# Patient Record
Sex: MALE | Race: WHITE | HISPANIC OR LATINO | Employment: UNEMPLOYED | URBAN - NONMETROPOLITAN AREA
[De-identification: names, ages, dates, MRNs, and addresses within clinical notes are randomized per-mention and may not be internally consistent; named-entity substitution may affect disease eponyms.]

---

## 2017-01-03 DIAGNOSIS — M10.00 IDIOPATHIC GOUT, UNSPECIFIED CHRONICITY, UNSPECIFIED SITE: Primary | ICD-10-CM

## 2017-01-03 RX ORDER — NAPROXEN 500 MG/1
500 TABLET ORAL 2 TIMES DAILY PRN
Qty: 30 TABLET | Refills: 3 | Status: SHIPPED | OUTPATIENT
Start: 2017-01-03 | End: 2017-01-16

## 2017-01-03 RX ORDER — COLCHICINE 0.6 MG/1
0.6 TABLET ORAL 2 TIMES DAILY
Qty: 60 TABLET | Refills: 2 | Status: SHIPPED | OUTPATIENT
Start: 2017-01-03 | End: 2017-03-09 | Stop reason: SDUPTHER

## 2017-01-03 NOTE — PROGRESS NOTES
Pt is calling with a gout flare. 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 Requesting something to help with gout flare.

## 2017-01-10 RX ORDER — FEBUXOSTAT 80 MG/1
80 TABLET, FILM COATED ORAL DAILY
Qty: 30 TABLET | Refills: 5 | Status: SHIPPED | OUTPATIENT
Start: 2017-01-10 | End: 2017-01-16

## 2017-01-10 RX ORDER — METHYLPREDNISOLONE 4 MG/1
TABLET ORAL
Qty: 21 TABLET | Refills: 0 | Status: SHIPPED | OUTPATIENT
Start: 2017-01-10 | End: 2017-01-16

## 2017-01-10 RX ORDER — ESCITALOPRAM OXALATE 5 MG/1
5 TABLET ORAL DAILY
Qty: 30 TABLET | Refills: 5 | Status: SHIPPED | OUTPATIENT
Start: 2017-01-10 | End: 2017-03-09 | Stop reason: SDUPTHER

## 2017-01-16 ENCOUNTER — OFFICE VISIT (OUTPATIENT)
Dept: FAMILY MEDICINE CLINIC | Facility: CLINIC | Age: 52
End: 2017-01-16

## 2017-01-16 VITALS
WEIGHT: 203 LBS | RESPIRATION RATE: 20 BRPM | HEIGHT: 73 IN | OXYGEN SATURATION: 95 % | BODY MASS INDEX: 26.9 KG/M2 | DIASTOLIC BLOOD PRESSURE: 74 MMHG | HEART RATE: 100 BPM | TEMPERATURE: 97.9 F | SYSTOLIC BLOOD PRESSURE: 120 MMHG

## 2017-01-16 DIAGNOSIS — K21.00 GERD WITH ESOPHAGITIS: ICD-10-CM

## 2017-01-16 DIAGNOSIS — F41.9 ANXIETY: ICD-10-CM

## 2017-01-16 DIAGNOSIS — Z13.9 SCREENING: ICD-10-CM

## 2017-01-16 DIAGNOSIS — M19.90 ARTHRITIS: Primary | ICD-10-CM

## 2017-01-16 PROCEDURE — 99214 OFFICE O/P EST MOD 30 MIN: CPT | Performed by: NURSE PRACTITIONER

## 2017-01-16 RX ORDER — ALLOPURINOL 100 MG/1
100 TABLET ORAL 2 TIMES DAILY
Qty: 60 TABLET | Refills: 5 | Status: SHIPPED | OUTPATIENT
Start: 2017-01-16 | End: 2017-09-26 | Stop reason: SDUPTHER

## 2017-01-16 RX ORDER — HYDROXYZINE PAMOATE 25 MG/1
25 CAPSULE ORAL NIGHTLY
Qty: 30 CAPSULE | Refills: 5 | Status: SHIPPED | OUTPATIENT
Start: 2017-01-16 | End: 2017-03-09 | Stop reason: SDUPTHER

## 2017-01-16 RX ORDER — HYDROCODONE BITARTRATE AND ACETAMINOPHEN 5; 325 MG/1; MG/1
1 TABLET ORAL EVERY 8 HOURS PRN
Qty: 60 TABLET | Refills: 0 | Status: SHIPPED | OUTPATIENT
Start: 2017-01-16 | End: 2017-03-09 | Stop reason: SDUPTHER

## 2017-01-16 RX ORDER — ESOMEPRAZOLE MAGNESIUM 40 MG/1
40 CAPSULE, DELAYED RELEASE ORAL DAILY
Qty: 30 CAPSULE | Refills: 5 | Status: SHIPPED | OUTPATIENT
Start: 2017-01-16 | End: 2017-03-09 | Stop reason: SDUPTHER

## 2017-01-16 NOTE — PROGRESS NOTES
Subjective   Barrington Chao is a 51 y.o. male.     History of Present Illness     Barrington is here today complaining of gout and arthritis pain. He has been taking some otc medications as well as some other medications he have from previous surgery.     GERD  Barrington reports that his gerd is stable with use of the Nexium. He does complain of at nights when he lays down having some tight feeling in his throat like food is stuck in his throat. He is unsure if this is anxiety as he does report that he has a hard time sleeping due to anxiety.     Anxiety  Mother is on life support, not expecting a recovery. Started Lexapro last week. Thinks his anxiety is some improved . He denies side effects, denies thoughts of hurting self or others.     Arthritis/gout  Pain in his back and knees. Taking his gout medication at this time. Has been trying Nsaids, not helping much. Rates his knee pain as 7 on psr. Described as throbbing aching pain in both knees and low back. 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 No history of substance abuse or addiction. Pt is requesting something for prn pain.       Uric acid [IHM092] (Order [id number])   Lab   Date: 6/14/2016 Department: [facility] FAM[facility] MEDICINE Released By: Katelynn Delgado MA Authorizing: DIPTI Perez          Uric acid   Order: [id number]   Status:  Final result   Visible to patient:  No (Not Released) Dx:  Pain in joint, multiple sites; Wheezi...   Notes Recorded by DIPTI Perez on 6/20/2016 at 8:39 AM  Start him on allopurinol 100mg daily x 1 week then 100mg bid . Call in # 60 5 refills.       ------    Notes Recorded by DIPTI Perez on 6/15/2016 at 8:00 AM  Start him on Allopurinol 100 mg twice daily. # 60 5 refills. Refer him to nephrology for low EGFR.     Lisa      Ref Range & Units 7mo ago     Uric Acid 2.4 - 5.7 mg/dL 7.8 (H)   [facility]    COR LAB      Specimen Collected: 06/14/16  4:52 PM Last Resulted: 06/14/16  6:03 PM                       Related Result Highlights       Comprehensive metabolic panel  Final result 6/14/2016             Lipid panel  Final result 6/14/2016            Other Results from 6/14/2016    Hemoglobin A1c  Final result 6/14/2016    Vitamin D 25 hydroxy  Final result 6/14/2016    H. pylori antibody, IgG  Final result 6/14/2016    PSA, total and free  Final result 6/14/2016   Uric acid [GXQ125] (Order [id number])   Lab   Date: 6/14/2016 Department: [facility] FAMILY MEDICINE Released By: Katelynn Delgado MA Authorizing: DIPTI Perez   Results   Uric acid (Order [id number])   6/14/2016  6:03 PM   Component Results   Component Value Flag Ref Range Units Status   Uric Acid 7.8 (H) 2.4 - 5.7 mg/dL Final   Lab and Collection   Uric acid (Order #[id number]) on 6/14/2016 - Lab and Collection Information   Reviewed By Lidia Mccarthy MA on 6/21/2016  8:17 AM   DIPTI Perez on 6/20/2016  8:39 AM   Danielle Mccarthy MA on 6/15/2016  2:07 PM   DIPTI Perez on 6/15/2016  8:00 AM   Call Information      Provider Department Center   6/14/2016 4:51 PM Katelynn Delgado MA [facility]    Testing Performed By   Lab - Abbreviation Name Director Address Valid Date Range   [id number] COR LAB [facility] LABORATORY Cecilia Londono MD [[id number]] <br>1 [facility]  [place] [address] 10/26/15 1318-Present   Additional Information   Specimen ID Draw Type Specimen Type Specimen Source Bill Type Client ID   [id number] Venipuncture Blood Arm, Left Client [1]    Specimen Date Taken Specimen Time Taken Specimen Received Date Specimen Received Time Result Date Result Time   Jun 14, 2016  4:52 PM Jun 14, 2016  5:03 PM Jun 14, 2016  6:03 PM   Recipient List for Orders   Sent From To Cc'd Forwarded To Results   1/16/2017  8:58 AM     Uric  acid [[id number]]          Order History   Outpatient   Date/Time Action Taken User Additional Information   06/14/16 1651 Release Katelynn Delgado MA From Order: [id number]   06/14/16 1803 Result Lab, Background User Final   Order Details   Frequency Duration Priority Order Class   None None Routine Lab Collect   Order Information   Order Date/Time Release Date/Time Start Date/Time End Date/Time   06/14/16 04:51 PM 06/14/16 04:51 PM 06/14/16 04:51 PM None   Related Result Highlights       Comprehensive metabolic panel  Final result 6/14/2016             Lipid panel  Final result 6/14/2016            Other Results from 6/14/2016    Hemoglobin A1c  Final result 6/14/2016    Vitamin D 25 hydroxy  Final result 6/14/2016    H. pylori antibody, IgG  Final result 6/14/2016    PSA, total and free  Final result 6/14/2016   Verbal Order Info   Action Created on Order Mode Entered by Responsible Provider Signed by Signed on   Ordering 06/14/16 1651 Per protocol: no cosign required Katelynn Delgado MA  Signature Not Required    Clinical Indications      ICD-10-CM ICD-9-CM   Problems with swallowing and mastication     R13.10 V41.6   Fatigue due to excessive exertion, initial encounter     T73.3XXA 994.5  E927.9   Pain in joint, multiple sites     M25.50 719.49   Wheezing     R06.2 786.07   Screening     Z13.9 V82.9   Hesitancy     R39.11 788.64   Reprint Order Requisition   URIC ACID (Order #[id number]) on 6/14/16   Encounter   View Encounter       Order Provider Info       Office phone Pager E-mail   Ordering User Katelynn Delgado MA -- -- --   Authorizing Provider Lisa Dominguez, APRN [phone] -- --   Lab Component SmartPhrase Guide   URIC ACID (Order #[id number]) on 6/14/16   Order Transmittal Tracking   URIC ACID (Order #[id number]) on 6/14/16   Medication Detail      Disp Refills Start End      URIC ACID   6/14/2016 6/14/2016    Class: Lab Collect      Most recent Uric acid level reviewed.     The  following portions of the patient's history were reviewed and updated as appropriate: allergies, current medications, past family history, past medical history, past social history, past surgical history and problem list.    Review of Systems   Constitutional: Positive for activity change. Negative for appetite change, chills, fatigue and fever.   HENT: Negative for ear pain, facial swelling, hearing loss, sinus pressure, sore throat, trouble swallowing and voice change.    Eyes: Negative for pain, discharge and visual disturbance.   Respiratory: Negative for apnea, cough, chest tightness, shortness of breath and wheezing.    Cardiovascular: Negative for chest pain, palpitations and leg swelling.   Gastrointestinal: Negative for abdominal pain, blood in stool, constipation, diarrhea, nausea and vomiting.   Endocrine: Negative.    Genitourinary: Negative for dysuria and flank pain.   Musculoskeletal: Positive for arthralgias and back pain. Negative for neck stiffness.   Skin: Negative for color change.   Allergic/Immunologic: Negative.    Neurological: Negative.  Negative for headaches.   Hematological: Negative for adenopathy. Does not bruise/bleed easily.   Psychiatric/Behavioral: Positive for sleep disturbance. Negative for confusion, self-injury and suicidal ideas. The patient is nervous/anxious.    All other systems reviewed and are negative.      Objective   Physical Exam   Constitutional: He is oriented to person, place, and time. He appears well-developed and well-nourished.   HENT:   Head: Normocephalic.   Right Ear: External ear normal.   Left Ear: External ear normal.   Nose: Nose normal.   Mouth/Throat: Oropharynx is clear and moist.   Eyes: Pupils are equal, round, and reactive to light.   Neck: Trachea normal and normal range of motion. Neck supple. No tracheal deviation present. No thyromegaly present.   Cardiovascular: Normal rate, regular rhythm and normal heart sounds.  Exam reveals no gallop and no  friction rub.    No murmur heard.  Pulmonary/Chest: Effort normal and breath sounds normal. No respiratory distress. He has no wheezes. He has no rales. He exhibits no tenderness.   Abdominal: Soft. Bowel sounds are normal. He exhibits no distension and no mass. There is no tenderness. There is no rebound and no guarding.   Musculoskeletal: Normal range of motion.        Lumbar back: He exhibits pain.   Crepitus both knees    Neurological: He is alert and oriented to person, place, and time. He has normal reflexes.   CN 2-12 grossly intact    Skin: Skin is warm and dry. No rash noted. No erythema.   Psychiatric: He has a normal mood and affect. His speech is normal and behavior is normal. Judgment and thought content normal. Cognition and memory are normal.   Vitals reviewed.      Assessment/Plan   Diagnoses and all orders for this visit:    Arthritis  -     Urine Drug Screen    Anxiety  -     Urine Drug Screen    Screening  -     Ambulatory Referral to General Surgery    GERD with esophagitis    Other orders  -     HYDROcodone-acetaminophen (NORCO) 5-325 MG per tablet; Take 1 tablet by mouth Every 8 (Eight) Hours As Needed for moderate pain (4-6).  -     esomeprazole (nexIUM) 40 MG capsule; Take 1 capsule by mouth Daily.  -     hydrOXYzine (VISTARIL) 25 MG capsule; Take 1 capsule by mouth Every Night.  -     allopurinol (ZYLOPRIM) 100 MG tablet; Take 1 tablet by mouth 2 (Two) Times a Day.        Continue Lexapro  Uds today  Emotional support and active listening provided.   Joe requested.   Continue on otc nsaids prn and use of tylenol with in guidelines remaining less than 1500 mg daily.   Norco prn.   Refer to Dr. Fernandez for EGD and colonoscopy for screening.   No driving on narcotic medication or vistaril until effect can be determined.   Body mechanics.   Follow up in one month, sooner if needed.

## 2017-01-16 NOTE — MR AVS SNAPSHOT
Barrington Chao   1/16/2017 7:45 AM   Office Visit    Dept Phone:  [phone]   Encounter #:  [id number]    Provider:  DIPTI Perez   Department:  [facility] FAMILY MEDICINE                Your Full Care Plan              Today's Medication Changes          These changes are accurate as of: 1/16/17  8:05 AM.  If you have any questions, ask your nurse or doctor.               New Medication(s)Ordered:     HYDROcodone-acetaminophen 5-325 MG per tablet   Commonly known as:  NORCO   Take 1 tablet by mouth Every 8 (Eight) Hours As Needed for moderate pain (4-6).   Started by:  DIPTI Perez       hydrOXYzine 25 MG capsule   Commonly known as:  VISTARIL   Take 1 capsule by mouth Every Night.   Started by:  DIPTI Perez         Stop taking medication(s)listed here:     MethylPREDNISolone 4 MG tablet   Commonly known as:  MEDROL (JOSE LUIS)   Stopped by:  DIPTI Perez           naproxen 500 MG tablet   Commonly known as:  NAPROSYN   Stopped by:  DIPTI Perez                Where to Get Your Medications      These medications were sent to [place], [place] - [address] [phone] [facility] [phone] [address] [address]     Phone:  [phone]     esomeprazole 40 MG capsule    hydrOXYzine 25 MG capsule         You can get these medications from any pharmacy     Bring a paper prescription for each of these medications     HYDROcodone-acetaminophen 5-325 MG per tablet                  Your Updated Medication List          This list is accurate as of: 1/16/17  8:05 AM.  Always use your most recent med list.                albuterol 108 (90 BASE) MCG/ACT inhaler   Commonly known as:  PROVENTIL HFA;VENTOLIN HFA   Inhale 2 puffs every 4 (four) hours as needed for shortness of air.       colchicine 0.6 MG tablet   Take 1 tablet by mouth 2 (Two) Times a Day.        escitalopram 5 MG tablet   Commonly known as:  LEXAPRO   Take 1 tablet by mouth Daily.       esomeprazole 40 MG capsule   Commonly known as:  nexIUM   Take 1 capsule by mouth Daily.       febuxostat 80 MG tablet tablet   Commonly known as:  ULORIC   Take 1 tablet by mouth Daily.       HYDROcodone-acetaminophen 5-325 MG per tablet   Commonly known as:  NORCO   Take 1 tablet by mouth Every 8 (Eight) Hours As Needed for moderate pain (4-6).       hydrOXYzine 25 MG capsule   Commonly known as:  VISTARIL   Take 1 capsule by mouth Every Night.               We Performed the Following     Ambulatory Referral to General Surgery       You Were Diagnosed With        Codes Comments    Arthritis    -  Primary ICD-10-CM: M19.90  ICD-9-CM: 716.90     Anxiety     ICD-10-CM: F41.9  ICD-9-CM: 300.00     Screening     ICD-10-CM: Z13.9  ICD-9-CM: V82.9       Instructions     None    Patient Instructions History      Upcoming Appointments     Visit Type Date Time Department    OFFICE VISIT 1/16/2017  7:45 AM [facility]    OFFICE VISIT 2/16/2017 10:00 AM [facility]      [employer] Signup     Our records indicate that you have declined [facility] [employer] signup. If you would like to sign up for [employer], please email [email or web address] or call [phone] to obtain an activation code.             Other Info from Your Visit           Your Appointments     Feb 16, 2017 10:00 AM EST   Office Visit with DIPTI Perez   [facility] FAMILY MEDICINE (--)    [address] [place] [id number] [phone]           Please arrive 10 minutes early, bring a complete list of all medications and bring any previous records or diagnostic testing results.              Allergies     No Known Allergies      Vital Signs     Smoking Status                   Never Smoker           Problems and Diagnoses Noted     Anxiety problem    Arthritis    Screening

## 2017-03-02 RX ORDER — FAMOTIDINE 40 MG/1
TABLET, FILM COATED ORAL
Qty: 30 TABLET | Refills: 5 | Status: SHIPPED | OUTPATIENT
Start: 2017-03-02 | End: 2017-03-09 | Stop reason: SDUPTHER

## 2017-03-09 ENCOUNTER — OFFICE VISIT (OUTPATIENT)
Dept: FAMILY MEDICINE CLINIC | Facility: CLINIC | Age: 52
End: 2017-03-09

## 2017-03-09 VITALS
HEIGHT: 73 IN | HEART RATE: 71 BPM | WEIGHT: 208 LBS | BODY MASS INDEX: 27.57 KG/M2 | DIASTOLIC BLOOD PRESSURE: 90 MMHG | SYSTOLIC BLOOD PRESSURE: 148 MMHG | OXYGEN SATURATION: 98 % | TEMPERATURE: 94.4 F

## 2017-03-09 DIAGNOSIS — M10.00 IDIOPATHIC GOUT, UNSPECIFIED CHRONICITY, UNSPECIFIED SITE: ICD-10-CM

## 2017-03-09 DIAGNOSIS — M15.9 PRIMARY OSTEOARTHRITIS INVOLVING MULTIPLE JOINTS: Primary | ICD-10-CM

## 2017-03-09 PROCEDURE — 99214 OFFICE O/P EST MOD 30 MIN: CPT | Performed by: NURSE PRACTITIONER

## 2017-03-09 RX ORDER — ESOMEPRAZOLE MAGNESIUM 40 MG/1
40 CAPSULE, DELAYED RELEASE ORAL DAILY
Qty: 30 CAPSULE | Refills: 5 | Status: SHIPPED | OUTPATIENT
Start: 2017-03-09 | End: 2017-09-26 | Stop reason: SDUPTHER

## 2017-03-09 RX ORDER — HYDROCODONE BITARTRATE AND ACETAMINOPHEN 5; 325 MG/1; MG/1
1 TABLET ORAL EVERY 8 HOURS PRN
Qty: 60 TABLET | Refills: 0 | Status: SHIPPED | OUTPATIENT
Start: 2017-03-09 | End: 2019-07-08

## 2017-03-09 RX ORDER — HYDROXYZINE PAMOATE 25 MG/1
25 CAPSULE ORAL NIGHTLY
Qty: 30 CAPSULE | Refills: 5 | Status: SHIPPED | OUTPATIENT
Start: 2017-03-09 | End: 2017-09-26 | Stop reason: SDUPTHER

## 2017-03-09 RX ORDER — ESCITALOPRAM OXALATE 5 MG/1
5 TABLET ORAL DAILY
Qty: 30 TABLET | Refills: 5 | Status: SHIPPED | OUTPATIENT
Start: 2017-03-09 | End: 2017-09-26 | Stop reason: SDUPTHER

## 2017-03-09 RX ORDER — COLCHICINE 0.6 MG/1
0.6 TABLET ORAL 2 TIMES DAILY
Qty: 60 TABLET | Refills: 2 | Status: SHIPPED | OUTPATIENT
Start: 2017-03-09 | End: 2017-09-26 | Stop reason: SDUPTHER

## 2017-03-09 RX ORDER — FAMOTIDINE 40 MG/1
40 TABLET, FILM COATED ORAL 2 TIMES DAILY PRN
Qty: 60 TABLET | Refills: 5 | Status: SHIPPED | OUTPATIENT
Start: 2017-03-09 | End: 2018-04-04 | Stop reason: SDUPTHER

## 2017-03-09 NOTE — PROGRESS NOTES
Subjective   Barrington Chao is a 51 y.o. male.     History of Present Illness     Arthritis/gout  Pain in his back and knees. Taking his gout medication at this time. Has tried Nsaids which irritate his stomach and do not help much. Rates his knee pain as 7 on psr. Described as throbbing aching pain in both knees and low back. 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 No history of substance abuse or addiction. Pt reports that prn Norco helps ease his pain , he takes only as needed.     Elevated blood pressure  Pt states he has been eating a high salt diet and not exercising this winter. He declines to start blood pressure medication. He would like to work on diet and exercise. He will monitor his blood pressure and report any reading over 140/90.      Depression with anxiety  Stable, no thoughts of hurting self or others.     Gerd  Stable       The following portions of the patient's history were reviewed and updated as appropriate: allergies, current medications, past family history, past medical history, past social history, past surgical history and problem list.    Review of Systems   Constitutional: Negative for activity change, appetite change, chills, fatigue and fever.   HENT: Negative for ear pain, facial swelling, hearing loss, sinus pressure, sore throat, trouble swallowing and voice change.    Eyes: Negative for pain, discharge and visual disturbance.   Respiratory: Negative for apnea, cough, chest tightness, shortness of breath and wheezing.    Cardiovascular: Negative for chest pain, palpitations and leg swelling.   Gastrointestinal: Negative for abdominal pain, blood in stool, constipation, diarrhea, nausea and vomiting.   Genitourinary: Negative for dysuria.   Musculoskeletal: Positive for arthralgias and back pain. Negative for neck stiffness.   Skin: Negative for color change.   Neurological: Negative for headaches.   Psychiatric/Behavioral: Negative for  "confusion and suicidal ideas. The patient is not nervous/anxious.    All other systems reviewed and are negative.      Objective   Physical Exam   Constitutional: He is oriented to person, place, and time. He appears well-developed and well-nourished.   HENT:   Head: Normocephalic and atraumatic.   Right Ear: External ear normal.   Left Ear: External ear normal.   Nose: Nose normal.   Mouth/Throat: Oropharynx is clear and moist.   Eyes: EOM are normal. Pupils are equal, round, and reactive to light.   Neck: Normal range of motion. Neck supple.   Cardiovascular: Normal rate, regular rhythm, normal heart sounds and intact distal pulses.    No murmur heard.  Pulmonary/Chest: Effort normal and breath sounds normal. No respiratory distress. He has no wheezes. He has no rales. He exhibits no tenderness.   Abdominal: Soft. Bowel sounds are normal. He exhibits no distension and no mass. There is no tenderness. There is no rebound and no guarding.   Musculoskeletal:        Lumbar back: He exhibits decreased range of motion, tenderness, pain and spasm.   Decreased lumbar curvature, there is pain with forward flexion. DTR's +2. No edema.   Crepitus both knees    Neurological: He is alert and oriented to person, place, and time. He has normal reflexes.   Skin: Skin is warm and dry. No rash noted. No erythema. No pallor.   Psychiatric: He has a normal mood and affect. His speech is normal and behavior is normal. Judgment and thought content normal. His affect is not inappropriate. Cognition and memory are normal.   Denies thoughts of hurting self or others.   Nursing note and vitals reviewed.    Visit Vitals   • /90 (BP Location: Left arm, Patient Position: Sitting, Cuff Size: Adult)   • Pulse 71   • Temp 94.4 °F (34.7 °C) (Tympanic)   • Ht 73\" (185.4 cm)   • Wt 208 lb (94.3 kg)   • SpO2 98%   • BMI 27.44 kg/m2       Assessment/Plan   Barrington was seen today for anxiety and osteoarthritis.    Diagnoses and all orders for this " visit:    Primary osteoarthritis involving multiple joints  -     XR Knee 1 or 2 View Bilateral; Future  -     XR Spine Lumbar 2 or 3 View; Future    Idiopathic gout, unspecified chronicity, unspecified site  -     colchicine 0.6 MG tablet; Take 1 tablet by mouth 2 (Two) Times a Day.    Other orders  -     HYDROcodone-acetaminophen (NORCO) 5-325 MG per tablet; Take 1 tablet by mouth Every 8 (Eight) Hours As Needed for moderate pain (4-6).  -     escitalopram (LEXAPRO) 5 MG tablet; Take 1 tablet by mouth Daily.  -     esomeprazole (nexIUM) 40 MG capsule; Take 1 capsule by mouth Daily.  -     famotidine (PEPCID) 40 MG tablet; Take 1 tablet by mouth 2 (Two) Times a Day As Needed for heartburn.  -     hydrOXYzine (VISTARIL) 25 MG capsule; Take 1 capsule by mouth Every Night.    Depression with anxiety   - stable       Joe and uds are on file as consistent.  Order for xrays of lumbar and both knees to be completed prior to next visit.  I have discussed diagnosis in detail today allowing time for questions and answers. Pt is aware of reasons to seek urgent or emergent medical care as well as reasons to return to the clinic for evaluation. Possible side effects, interactions and progression of symptoms discussed as well. Pt / family states understanding.    Goal: Maintain mobility and promote quality of life.

## 2017-09-26 DIAGNOSIS — M10.00 IDIOPATHIC GOUT, UNSPECIFIED CHRONICITY, UNSPECIFIED SITE: ICD-10-CM

## 2017-09-26 RX ORDER — ALBUTEROL SULFATE 90 UG/1
2 AEROSOL, METERED RESPIRATORY (INHALATION) EVERY 4 HOURS PRN
Qty: 1 INHALER | Refills: 5 | Status: SHIPPED | OUTPATIENT
Start: 2017-09-26 | End: 2018-07-19

## 2017-09-26 RX ORDER — ESOMEPRAZOLE MAGNESIUM 40 MG/1
40 CAPSULE, DELAYED RELEASE ORAL DAILY
Qty: 30 CAPSULE | Refills: 5 | Status: SHIPPED | OUTPATIENT
Start: 2017-09-26 | End: 2018-04-05 | Stop reason: ALTCHOICE

## 2017-09-26 RX ORDER — ESCITALOPRAM OXALATE 5 MG/1
5 TABLET ORAL DAILY
Qty: 30 TABLET | Refills: 5 | Status: SHIPPED | OUTPATIENT
Start: 2017-09-26 | End: 2018-07-19

## 2017-09-26 RX ORDER — COLCHICINE 0.6 MG/1
0.6 TABLET ORAL 2 TIMES DAILY
Qty: 60 TABLET | Refills: 5 | Status: SHIPPED | OUTPATIENT
Start: 2017-09-26 | End: 2018-07-19

## 2017-09-26 RX ORDER — ALLOPURINOL 100 MG/1
100 TABLET ORAL 2 TIMES DAILY
Qty: 60 TABLET | Refills: 5 | Status: SHIPPED | OUTPATIENT
Start: 2017-09-26 | End: 2018-07-19 | Stop reason: SDUPTHER

## 2017-09-26 RX ORDER — HYDROXYZINE PAMOATE 25 MG/1
25 CAPSULE ORAL NIGHTLY
Qty: 30 CAPSULE | Refills: 5 | Status: SHIPPED | OUTPATIENT
Start: 2017-09-26 | End: 2018-07-19

## 2018-04-05 RX ORDER — FAMOTIDINE 40 MG/1
TABLET, FILM COATED ORAL
Qty: 60 TABLET | Refills: 0 | Status: SHIPPED | OUTPATIENT
Start: 2018-04-05 | End: 2018-06-01 | Stop reason: SDUPTHER

## 2018-06-03 RX ORDER — FAMOTIDINE 40 MG/1
TABLET, FILM COATED ORAL
Qty: 60 TABLET | Refills: 5 | Status: SHIPPED | OUTPATIENT
Start: 2018-06-03 | End: 2018-07-19

## 2018-07-19 ENCOUNTER — OFFICE VISIT (OUTPATIENT)
Dept: FAMILY MEDICINE CLINIC | Facility: CLINIC | Age: 53
End: 2018-07-19

## 2018-07-19 VITALS
WEIGHT: 201 LBS | DIASTOLIC BLOOD PRESSURE: 86 MMHG | HEIGHT: 73 IN | HEART RATE: 85 BPM | BODY MASS INDEX: 26.64 KG/M2 | OXYGEN SATURATION: 98 % | TEMPERATURE: 97.5 F | SYSTOLIC BLOOD PRESSURE: 138 MMHG

## 2018-07-19 DIAGNOSIS — M79.10 MYALGIA: ICD-10-CM

## 2018-07-19 DIAGNOSIS — B00.1 COLD SORE: ICD-10-CM

## 2018-07-19 DIAGNOSIS — N52.9 ERECTILE DYSFUNCTION, UNSPECIFIED ERECTILE DYSFUNCTION TYPE: ICD-10-CM

## 2018-07-19 DIAGNOSIS — R13.14 PHARYNGOESOPHAGEAL DYSPHAGIA: ICD-10-CM

## 2018-07-19 DIAGNOSIS — R74.8 ELEVATED LIVER ENZYMES: ICD-10-CM

## 2018-07-19 DIAGNOSIS — R30.0 DYSURIA: Primary | ICD-10-CM

## 2018-07-19 DIAGNOSIS — M15.9 PRIMARY OSTEOARTHRITIS INVOLVING MULTIPLE JOINTS: ICD-10-CM

## 2018-07-19 DIAGNOSIS — K21.00 GERD WITH ESOPHAGITIS: ICD-10-CM

## 2018-07-19 DIAGNOSIS — M1A.09X0 CHRONIC GOUT OF MULTIPLE SITES, UNSPECIFIED CAUSE: ICD-10-CM

## 2018-07-19 DIAGNOSIS — R63.4 WEIGHT LOSS: ICD-10-CM

## 2018-07-19 DIAGNOSIS — F41.1 GAD (GENERALIZED ANXIETY DISORDER): ICD-10-CM

## 2018-07-19 DIAGNOSIS — R79.89 ELEVATED LIVER FUNCTION TESTS: ICD-10-CM

## 2018-07-19 LAB
ALBUMIN SERPL-MCNC: 4.4 G/DL (ref 3.5–5)
ALBUMIN/GLOB SERPL: 1.5 G/DL (ref 1.5–2.5)
ALP SERPL-CCNC: 113 U/L (ref 40–129)
ALT SERPL W P-5'-P-CCNC: 99 U/L (ref 10–44)
ANION GAP SERPL CALCULATED.3IONS-SCNC: 6.7 MMOL/L (ref 3.6–11.2)
AST SERPL-CCNC: 80 U/L (ref 10–34)
BILIRUB BLD-MCNC: NEGATIVE MG/DL
BILIRUB SERPL-MCNC: 0.4 MG/DL (ref 0.2–1.8)
BUN BLD-MCNC: 25 MG/DL (ref 7–21)
BUN/CREAT SERPL: 16.7 (ref 7–25)
CALCIUM SPEC-SCNC: 9.1 MG/DL (ref 7.7–10)
CHLORIDE SERPL-SCNC: 106 MMOL/L (ref 99–112)
CLARITY, POC: CLEAR
CO2 SERPL-SCNC: 27.3 MMOL/L (ref 24.3–31.9)
COLOR UR: NORMAL
CREAT BLD-MCNC: 1.5 MG/DL (ref 0.43–1.29)
GFR SERPL CREATININE-BSD FRML MDRD: 49 ML/MIN/1.73
GLOBULIN UR ELPH-MCNC: 2.9 GM/DL
GLUCOSE BLD-MCNC: 91 MG/DL (ref 70–110)
GLUCOSE UR STRIP-MCNC: NEGATIVE MG/DL
HBV SURFACE AB SER RIA-ACNC: NORMAL
HBV SURFACE AG SERPL QL IA: NORMAL
HCV AB SER DONR QL: NORMAL
HIV1+2 AB SER QL: NORMAL
KETONES UR QL: NEGATIVE
LEUKOCYTE EST, POC: NEGATIVE
NITRITE UR-MCNC: NEGATIVE MG/ML
OSMOLALITY SERPL CALC.SUM OF ELEC: 283.4 MOSM/KG (ref 273–305)
PH UR: 6 [PH] (ref 5–8)
POTASSIUM BLD-SCNC: 4.3 MMOL/L (ref 3.5–5.3)
PROT SERPL-MCNC: 7.3 G/DL (ref 6–8)
PROT UR STRIP-MCNC: NEGATIVE MG/DL
RBC # UR STRIP: NEGATIVE /UL
SODIUM BLD-SCNC: 140 MMOL/L (ref 135–153)
SP GR UR: 1.02 (ref 1–1.03)
TSH SERPL DL<=0.05 MIU/L-ACNC: 1.94 MIU/ML (ref 0.55–4.78)
UROBILINOGEN UR QL: NORMAL

## 2018-07-19 PROCEDURE — G0432 EIA HIV-1/HIV-2 SCREEN: HCPCS | Performed by: PHYSICIAN ASSISTANT

## 2018-07-19 PROCEDURE — 81003 URINALYSIS AUTO W/O SCOPE: CPT | Performed by: PHYSICIAN ASSISTANT

## 2018-07-19 PROCEDURE — 86708 HEPATITIS A ANTIBODY: CPT | Performed by: PHYSICIAN ASSISTANT

## 2018-07-19 PROCEDURE — 87340 HEPATITIS B SURFACE AG IA: CPT | Performed by: PHYSICIAN ASSISTANT

## 2018-07-19 PROCEDURE — 80053 COMPREHEN METABOLIC PANEL: CPT | Performed by: PHYSICIAN ASSISTANT

## 2018-07-19 PROCEDURE — 86704 HEP B CORE ANTIBODY TOTAL: CPT | Performed by: PHYSICIAN ASSISTANT

## 2018-07-19 PROCEDURE — 84443 ASSAY THYROID STIM HORMONE: CPT | Performed by: PHYSICIAN ASSISTANT

## 2018-07-19 PROCEDURE — 86803 HEPATITIS C AB TEST: CPT | Performed by: PHYSICIAN ASSISTANT

## 2018-07-19 PROCEDURE — 36415 COLL VENOUS BLD VENIPUNCTURE: CPT | Performed by: PHYSICIAN ASSISTANT

## 2018-07-19 PROCEDURE — 99214 OFFICE O/P EST MOD 30 MIN: CPT | Performed by: PHYSICIAN ASSISTANT

## 2018-07-19 PROCEDURE — 86706 HEP B SURFACE ANTIBODY: CPT | Performed by: PHYSICIAN ASSISTANT

## 2018-07-19 RX ORDER — FAMCICLOVIR 500 MG/1
1000 TABLET ORAL DAILY
Qty: 20 TABLET | Refills: 0 | Status: SHIPPED | OUTPATIENT
Start: 2018-07-19 | End: 2018-11-05 | Stop reason: SDUPTHER

## 2018-07-19 RX ORDER — SILDENAFIL CITRATE 20 MG/1
60 TABLET ORAL DAILY
Qty: 30 TABLET | Refills: 3 | Status: SHIPPED | OUTPATIENT
Start: 2018-07-19 | End: 2019-07-08 | Stop reason: SDUPTHER

## 2018-07-19 RX ORDER — TRIFLUOPERAZINE HYDROCHLORIDE 1 MG/1
1 TABLET, FILM COATED ORAL 2 TIMES DAILY PRN
Qty: 30 TABLET | Refills: 3 | Status: SHIPPED | OUTPATIENT
Start: 2018-07-19 | End: 2019-07-08

## 2018-07-19 RX ORDER — ALLOPURINOL 100 MG/1
100 TABLET ORAL 2 TIMES DAILY
Qty: 60 TABLET | Refills: 5 | Status: SHIPPED | OUTPATIENT
Start: 2018-07-19 | End: 2019-07-08 | Stop reason: SDUPTHER

## 2018-07-19 RX ORDER — OMEPRAZOLE 20 MG/1
20 CAPSULE, DELAYED RELEASE ORAL 2 TIMES DAILY
Qty: 60 CAPSULE | Refills: 5 | Status: SHIPPED | OUTPATIENT
Start: 2018-07-19 | End: 2019-07-08

## 2018-07-19 RX ORDER — OMEPRAZOLE 20 MG/1
40 CAPSULE, DELAYED RELEASE ORAL DAILY
COMMUNITY
End: 2018-07-19 | Stop reason: SDUPTHER

## 2018-07-19 RX ORDER — MELOXICAM 15 MG/1
15 TABLET ORAL DAILY
Qty: 30 TABLET | Refills: 0 | Status: SHIPPED | OUTPATIENT
Start: 2018-07-19 | End: 2019-07-08

## 2018-07-20 DIAGNOSIS — N28.9 RENAL INSUFFICIENCY: Primary | ICD-10-CM

## 2018-07-20 PROBLEM — R74.8 ELEVATED LIVER ENZYMES: Status: ACTIVE | Noted: 2018-07-20

## 2018-07-20 NOTE — PROGRESS NOTES
"Subjective   Rosamaria Chao is a 52 y.o. male.     Chief complaint-dysuria    History of Present Illness     Dysuria-  He complains of moderate burning with urination intermittently for the past 5 years.  He denies any hematuria.  He is requesting testing for hepatitis A, B, C, and HIV today.    Urinalysis today is unremarkable    Dysphagia-  He complains of dysphagia with anxiety concerning food getting \"stuck in\" his throat.  He reports dysphagia with solid foods but not liquids.  He states that he has anxiety and panic sweat and he has trouble swallowing.    Cold sore-  He complains of recurrent cold sores on his facial lips.  Some relief with antiviral therapy in the past.    Erectile dysfunction-improved with sildenafil when necessary    Chronic gout-stable with allopurinol prophylaxis.  He states his last acute flare of gout was over a year ago    Gastroesophageal reflux disease-stable with omeprazole.  He states that taking omeprazole decreases his dysphagia and thereby lessening his anxiety/panic attacks.    Generalized anxiety disorder- uncontrolled.  He states that he discontinued Lexapro several months ago on his own.  He states that he would rather try a when necessary medication for anxiety.    Osteoarthritis-  He complains of pain in his lumbar spine and bilateral knees.  Pain is described as intermittent severe and sharp.  Pain is worse with prolonged walking and after prolonged sitting.  Pain is also worse when first getting out of bed in the morning.  X-rays were ordered at his last office visit in January 2017 that he has not had them performed.    Elevated liver function tests-  Results for ROSAMARIA CHAO (MRN [id number]) as of 7/20/2018 17:30   Ref. Range 6/14/2016 16:52   ALT (SGPT) Latest Ref Range: 10 - 36 U/L 39 (H)   AST (SGOT) Latest Ref Range: 10 - 30 U/L 40 (H)     He also has some vague complaints including myalgia and minimal weight loss of 7 pounds.    The following portions of the " "patient's history were reviewed and updated as appropriate: allergies, current medications, past family history, past medical history, past social history, past surgical history and problem list.    Review of Systems   Constitutional: Positive for unexpected weight change. Negative for activity change, appetite change and fever.   HENT: Negative for ear pain, sinus pressure and sore throat.    Eyes: Negative for pain and visual disturbance.   Respiratory: Negative for cough and chest tightness.    Cardiovascular: Negative for chest pain and palpitations.   Gastrointestinal: Negative for abdominal pain, constipation, diarrhea, nausea and vomiting.        Dysphagia   Endocrine: Negative for polydipsia and polyuria.   Genitourinary: Positive for dysuria. Negative for frequency.   Musculoskeletal: Positive for arthralgias and myalgias. Negative for back pain.   Skin: Negative for color change and rash.   Allergic/Immunologic: Negative for food allergies and immunocompromised state.   Neurological: Negative for dizziness, syncope and headaches.   Hematological: Negative for adenopathy. Does not bruise/bleed easily.   Psychiatric/Behavioral: Negative for hallucinations and suicidal ideas. The patient is not nervous/anxious.        /86   Pulse 85   Temp 97.5 °F (36.4 °C) (Oral)   Ht 185.4 cm (72.99\")   Wt 91.2 kg (201 lb)   SpO2 98%   BMI 26.52 kg/m²   Orders Only on 06/14/2016   Component Date Value Ref Range Status   • Glucose 06/14/2016 101  70 - 110 mg/dL Final   • BUN 06/14/2016 20  7 - 21 mg/dL Final   • Creatinine 06/14/2016 1.18  0.43 - 1.29 mg/dL Final   • Sodium 06/14/2016 143  135 - 153 mmol/L Final   • Potassium 06/14/2016 4.5  3.5 - 5.3 mmol/L Final   • Chloride 06/14/2016 105  99 - 112 mmol/L Final   • CO2 06/14/2016 29.6  24.3 - 31.9 mmol/L Final   • Calcium 06/14/2016 9.4  7.7 - 10.0 mg/dL Final   • Total Protein 06/14/2016 7.4  6.0 - 8.0 g/dL Final   • Albumin 06/14/2016 4.40  3.50 - 5.00 g/dL " Final   • ALT (SGPT) 06/14/2016 39* 10 - 36 U/L Final   • AST (SGOT) 06/14/2016 40* 10 - 30 U/L Final   • Alkaline Phosphatase 06/14/2016 82  46 - 116 U/L Final   • Total Bilirubin 06/14/2016 0.6  0.2 - 1.8 mg/dL Final   • eGFR Non African Amer 06/14/2016 48* >60 mL/min/1.73 Final   • Globulin 06/14/2016 3.0  gm/dL Final   • A/G Ratio 06/14/2016 1.5  1.5 - 2.5 g/dL Final   • BUN/Creatinine Ratio 06/14/2016 16.9  7.0 - 25.0 Final   • Anion Gap 06/14/2016 8.4  3.6 - 11.2 mmol/L Final   • Total Cholesterol 06/14/2016 187  0 - 200 mg/dL Final   • Triglycerides 06/14/2016 90  0 - 150 mg/dL Final   • HDL Cholesterol 06/14/2016 36* 60 - 100 mg/dL Final   • LDL Cholesterol  06/14/2016 133* 0 - 100 mg/dL Final   • VLDL Cholesterol 06/14/2016 18  mg/dL Final   • LDL/HDL Ratio 06/14/2016 3.69   Final   • Hemoglobin A1C 06/14/2016 5.60  4.50 - 5.70 % Final   • 25 Hydroxy, Vitamin D 06/14/2016 27.0  ng/ml Final   • Uric Acid 06/14/2016 7.8* 2.4 - 5.7 mg/dL Final   • H. pylori IgG 06/14/2016 Negative  Negative, Equivocal Final   • PSA 06/14/2016 2.2  Not Estab. ng/mL Final   • PSA, Free 06/14/2016 0.26  N/A ng/mL Final   • PSA, Free % 06/14/2016 11.8  Not Estab. % Final       Physical Exam   Constitutional: He is oriented to person, place, and time. He appears well-developed and well-nourished.   HENT:   Head: Normocephalic and atraumatic.   Nose: Nose normal.   Mouth/Throat: Oropharynx is clear and moist.   Eyes: Pupils are equal, round, and reactive to light. Conjunctivae and EOM are normal.   Neck: Normal range of motion. Neck supple. No tracheal deviation present. No thyromegaly present.   Cardiovascular: Normal rate, regular rhythm, normal heart sounds and intact distal pulses.    No murmur heard.  Pulmonary/Chest: Effort normal and breath sounds normal. No respiratory distress. He has no wheezes.   Abdominal: Soft. Bowel sounds are normal. There is no tenderness. There is no guarding.   Musculoskeletal: Normal range of  motion. He exhibits no edema or tenderness.   Course crepitus noted in left knee   Lymphadenopathy:     He has no cervical adenopathy.   Neurological: He is alert and oriented to person, place, and time.   Skin: Skin is warm and dry. No rash noted.   Psychiatric: He has a normal mood and affect. His behavior is normal.   Nursing note and vitals reviewed.      Assessment/Plan     Diagnoses and all orders for this visit:    Dysuria  Comments:  Referral was made to urology for 8 AM tomorrow morning in [place] with Dr. Franco.  Orders:  -     POC Urinalysis Dipstick, Automated    Pharyngoesophageal dysphagia  Comments:  He declines ultrasound of neck and referral to GI for further evaluation and treatment.  Orders:  -     TSH; Future  -     TSH    Cold sore  -     famciclovir (FAMVIR) 500 MG tablet; Take 2 tablets by mouth Daily. Prn cold sores    Erectile dysfunction, unspecified erectile dysfunction type  -     sildenafil (REVATIO) 20 MG tablet; Take 3 tablets by mouth Daily.    Chronic gout of multiple sites, unspecified cause  -     allopurinol (ZYLOPRIM) 100 MG tablet; Take 1 tablet by mouth 2 (Two) Times a Day.    GERD with esophagitis  -     omeprazole (priLOSEC) 20 MG capsule; Take 1 capsule by mouth 2 (Two) Times a Day.    DEANNE (generalized anxiety disorder)  -     trifluoperazine (STELAZINE) 1 MG tablet; Take 1 tablet by mouth 2 (Two) Times a Day As Needed (anxiety).    Primary osteoarthritis involving multiple joints  Comments:  He declines radiology at this time.  Orders:  -     meloxicam (MOBIC) 15 MG tablet; Take 1 tablet by mouth Daily.    Weight loss  Comments:  He declines referral to GI at this time.   Orders:  -     Hepatitis A antibody, total; Future  -     Hepatitis C antibody; Future  -     Hepatitis B core antibody, total; Future  -     Hepatitis B surface antibody; Future  -     Hepatitis B surface antigen; Future  -     HIV-1 / O / 2 Ag / Antibody 4th Generation  -     Hepatitis A antibody,  total  -     Hepatitis C antibody  -     Hepatitis B core antibody, total  -     Hepatitis B surface antibody  -     Hepatitis B surface antigen    Myalgia  Comments:  He declines workup for this at this time.    Elevated liver function tests  -     Comprehensive Metabolic Panel  -     Osmolality, Calculated; Future  -     Osmolality, Calculated                 This document has been electronically signed by:  Violeta Rosales PA-C

## 2018-07-21 LAB
HAV AB SER QL IA: NEGATIVE
HBV CORE AB SER DONR QL IA: NEGATIVE

## 2018-07-24 ENCOUNTER — TELEPHONE (OUTPATIENT)
Dept: FAMILY MEDICINE CLINIC | Facility: CLINIC | Age: 53
End: 2018-07-24

## 2018-07-24 NOTE — TELEPHONE ENCOUNTER
I called spoke with corine informed him his kidney function was elevated and recommend he increase water intake and repeat labs in one month . He was ok to do labs since he could get them at hospital. Pt refused ultrasound of liver even after I informed him his liver function was elevated        ----- Message from DILLAN Giordano sent at 7/20/2018  8:58 AM EDT -----  Please inform the patient of abnormal labs  #1 his kidney function was elevated.  I recommend that he increase his water intake and repeat lab work in 1 month's time  #2 his liver function is elevated more than last lab draw-I recommend an ultrasound of the liver.  Please ask him if it is okay to schedule a liver ultrasound and let me know so I can put in the order.

## 2018-07-24 NOTE — TELEPHONE ENCOUNTER
----- Message from DILLAN Giordano sent at 7/20/2018  8:58 AM EDT -----  Please inform the patient of abnormal labs  #1 his kidney function was elevated.  I recommend that he increase his water intake and repeat lab work in 1 month's time  #2 his liver function is elevated more than last lab draw–I recommend an ultrasound of the liver.  Please ask him if it is okay to schedule a liver ultrasound and let me know so I can put in the order.

## 2018-08-08 DIAGNOSIS — R74.8 ELEVATED LIVER ENZYMES: Primary | ICD-10-CM

## 2018-08-15 ENCOUNTER — TELEPHONE (OUTPATIENT)
Dept: FAMILY MEDICINE CLINIC | Facility: CLINIC | Age: 53
End: 2018-08-15

## 2018-08-15 NOTE — TELEPHONE ENCOUNTER
----- Message from DILLAN Giordano sent at 8/15/2018 11:59 AM EDT -----  Inform the patient that his ultrasound of the liver was normal.  His elevated liver enzymes could be due to fatty liver disease.  I recommend a low-cholesterol/low-fat diet.

## 2018-08-15 NOTE — TELEPHONE ENCOUNTER
Spoke with patient and let him know his ultrasound was normal. I let him know he likely has fatty liver disease and to be on a low cholesterol/low fat diet. He voiced understanding.

## 2018-11-05 DIAGNOSIS — B00.1 COLD SORE: ICD-10-CM

## 2018-11-06 DIAGNOSIS — B00.1 COLD SORE: ICD-10-CM

## 2018-11-06 RX ORDER — FAMCICLOVIR 500 MG/1
TABLET ORAL
Qty: 20 TABLET | Refills: 0 | Status: SHIPPED | OUTPATIENT
Start: 2018-11-06 | End: 2018-11-06 | Stop reason: SDUPTHER

## 2018-11-07 RX ORDER — FAMCICLOVIR 500 MG/1
TABLET ORAL
Qty: 20 TABLET | Refills: 0 | Status: SHIPPED | OUTPATIENT
Start: 2018-11-07 | End: 2019-04-15 | Stop reason: SDUPTHER

## 2019-01-14 DIAGNOSIS — N52.9 ERECTILE DYSFUNCTION, UNSPECIFIED ERECTILE DYSFUNCTION TYPE: ICD-10-CM

## 2019-01-15 RX ORDER — SILDENAFIL CITRATE 20 MG/1
60 TABLET ORAL DAILY
Qty: 30 TABLET | Refills: 0 | OUTPATIENT
Start: 2019-01-15

## 2019-01-16 DIAGNOSIS — N52.9 ERECTILE DYSFUNCTION, UNSPECIFIED ERECTILE DYSFUNCTION TYPE: ICD-10-CM

## 2019-01-17 RX ORDER — SILDENAFIL CITRATE 20 MG/1
60 TABLET ORAL DAILY
Qty: 30 TABLET | Refills: 0 | OUTPATIENT
Start: 2019-01-17

## 2019-04-15 DIAGNOSIS — B00.1 COLD SORE: ICD-10-CM

## 2019-04-16 RX ORDER — FAMCICLOVIR 500 MG/1
TABLET ORAL
Qty: 20 TABLET | Refills: 0 | Status: SHIPPED | OUTPATIENT
Start: 2019-04-16 | End: 2019-05-20 | Stop reason: SDUPTHER

## 2019-05-20 DIAGNOSIS — B00.1 COLD SORE: ICD-10-CM

## 2019-05-22 RX ORDER — FAMCICLOVIR 500 MG/1
TABLET ORAL
Qty: 20 TABLET | Refills: 0 | Status: SHIPPED | OUTPATIENT
Start: 2019-05-22 | End: 2019-06-28 | Stop reason: SDUPTHER

## 2019-06-28 DIAGNOSIS — B00.1 COLD SORE: ICD-10-CM

## 2019-06-28 RX ORDER — FAMCICLOVIR 500 MG/1
TABLET ORAL
Qty: 20 TABLET | Refills: 0 | Status: SHIPPED | OUTPATIENT
Start: 2019-06-28 | End: 2019-07-08

## 2019-07-08 ENCOUNTER — OFFICE VISIT (OUTPATIENT)
Dept: FAMILY MEDICINE CLINIC | Facility: CLINIC | Age: 54
End: 2019-07-08

## 2019-07-08 VITALS
HEART RATE: 66 BPM | BODY MASS INDEX: 25.84 KG/M2 | WEIGHT: 195 LBS | OXYGEN SATURATION: 99 % | HEIGHT: 73 IN | SYSTOLIC BLOOD PRESSURE: 140 MMHG | TEMPERATURE: 97.4 F | DIASTOLIC BLOOD PRESSURE: 80 MMHG

## 2019-07-08 DIAGNOSIS — R74.8 ELEVATED LIVER ENZYMES: ICD-10-CM

## 2019-07-08 DIAGNOSIS — K21.00 GERD WITH ESOPHAGITIS: ICD-10-CM

## 2019-07-08 DIAGNOSIS — M19.90 ARTHRITIS: ICD-10-CM

## 2019-07-08 DIAGNOSIS — N28.9 RENAL INSUFFICIENCY: ICD-10-CM

## 2019-07-08 DIAGNOSIS — N52.9 ERECTILE DYSFUNCTION, UNSPECIFIED ERECTILE DYSFUNCTION TYPE: ICD-10-CM

## 2019-07-08 DIAGNOSIS — R30.0 DYSURIA: ICD-10-CM

## 2019-07-08 DIAGNOSIS — M1A.09X0 CHRONIC GOUT OF MULTIPLE SITES, UNSPECIFIED CAUSE: ICD-10-CM

## 2019-07-08 DIAGNOSIS — F41.9 ANXIETY: Primary | ICD-10-CM

## 2019-07-08 DIAGNOSIS — B00.1 COLD SORE: ICD-10-CM

## 2019-07-08 LAB
25(OH)D3 SERPL-MCNC: 54.3 NG/ML (ref 30–100)
ALBUMIN SERPL-MCNC: 4.2 G/DL (ref 3.5–5.2)
ALBUMIN/GLOB SERPL: 1.4 G/DL
ALP SERPL-CCNC: 63 U/L (ref 39–117)
ALT SERPL W P-5'-P-CCNC: 32 U/L (ref 1–41)
ANION GAP SERPL CALCULATED.3IONS-SCNC: 11.8 MMOL/L (ref 5–15)
AST SERPL-CCNC: 28 U/L (ref 1–40)
BACTERIA UR QL AUTO: NORMAL /HPF
BILIRUB SERPL-MCNC: 0.4 MG/DL (ref 0.2–1.2)
BILIRUB UR QL STRIP: NEGATIVE
BUN BLD-MCNC: 17 MG/DL (ref 6–20)
BUN/CREAT SERPL: 12.8 (ref 7–25)
CALCIUM SPEC-SCNC: 9.3 MG/DL (ref 8.6–10.5)
CHLORIDE SERPL-SCNC: 103 MMOL/L (ref 98–107)
CHOLEST SERPL-MCNC: 156 MG/DL (ref 0–200)
CLARITY UR: CLEAR
CO2 SERPL-SCNC: 27.2 MMOL/L (ref 22–29)
COLOR UR: YELLOW
CREAT BLD-MCNC: 1.33 MG/DL (ref 0.76–1.27)
GFR SERPL CREATININE-BSD FRML MDRD: 56 ML/MIN/1.73
GLOBULIN UR ELPH-MCNC: 3 GM/DL
GLUCOSE BLD-MCNC: 101 MG/DL (ref 65–99)
GLUCOSE UR STRIP-MCNC: NEGATIVE MG/DL
HBA1C MFR BLD: 5.61 % (ref 4.8–5.6)
HBV SURFACE AB SER RIA-ACNC: NORMAL
HBV SURFACE AG SERPL QL IA: NORMAL
HCV AB SER DONR QL: NORMAL
HDLC SERPL-MCNC: 35 MG/DL (ref 40–60)
HGB UR QL STRIP.AUTO: ABNORMAL
HYALINE CASTS UR QL AUTO: NORMAL /LPF
KETONES UR QL STRIP: NEGATIVE
LDLC SERPL CALC-MCNC: 99 MG/DL (ref 0–100)
LDLC/HDLC SERPL: 2.83 {RATIO}
LEUKOCYTE ESTERASE UR QL STRIP.AUTO: NEGATIVE
NITRITE UR QL STRIP: NEGATIVE
PH UR STRIP.AUTO: 6.5 [PH] (ref 5–8)
POTASSIUM BLD-SCNC: 4.7 MMOL/L (ref 3.5–5.2)
PROT SERPL-MCNC: 7.2 G/DL (ref 6–8.5)
PROT UR QL STRIP: NEGATIVE
RBC # UR: NORMAL /HPF
REF LAB TEST METHOD: NORMAL
SODIUM BLD-SCNC: 142 MMOL/L (ref 136–145)
SP GR UR STRIP: 1.01 (ref 1–1.03)
SQUAMOUS #/AREA URNS HPF: NORMAL /HPF
TRIGL SERPL-MCNC: 110 MG/DL (ref 0–150)
TSH SERPL DL<=0.05 MIU/L-ACNC: 3 MIU/ML (ref 0.27–4.2)
URATE SERPL-MCNC: 7.5 MG/DL (ref 3.4–7)
UROBILINOGEN UR QL STRIP: ABNORMAL
VIT B12 BLD-MCNC: 1356 PG/ML (ref 211–946)
VLDLC SERPL-MCNC: 22 MG/DL (ref 5–40)
WBC UR QL AUTO: NORMAL /HPF

## 2019-07-08 PROCEDURE — 86708 HEPATITIS A ANTIBODY: CPT | Performed by: NURSE PRACTITIONER

## 2019-07-08 PROCEDURE — 82306 VITAMIN D 25 HYDROXY: CPT | Performed by: NURSE PRACTITIONER

## 2019-07-08 PROCEDURE — 80061 LIPID PANEL: CPT | Performed by: NURSE PRACTITIONER

## 2019-07-08 PROCEDURE — 99214 OFFICE O/P EST MOD 30 MIN: CPT | Performed by: NURSE PRACTITIONER

## 2019-07-08 PROCEDURE — 83036 HEMOGLOBIN GLYCOSYLATED A1C: CPT | Performed by: NURSE PRACTITIONER

## 2019-07-08 PROCEDURE — 84443 ASSAY THYROID STIM HORMONE: CPT | Performed by: NURSE PRACTITIONER

## 2019-07-08 PROCEDURE — 82607 VITAMIN B-12: CPT | Performed by: NURSE PRACTITIONER

## 2019-07-08 PROCEDURE — 80053 COMPREHEN METABOLIC PANEL: CPT | Performed by: NURSE PRACTITIONER

## 2019-07-08 PROCEDURE — 81001 URINALYSIS AUTO W/SCOPE: CPT | Performed by: NURSE PRACTITIONER

## 2019-07-08 PROCEDURE — 86803 HEPATITIS C AB TEST: CPT | Performed by: NURSE PRACTITIONER

## 2019-07-08 PROCEDURE — 86704 HEP B CORE ANTIBODY TOTAL: CPT | Performed by: NURSE PRACTITIONER

## 2019-07-08 PROCEDURE — 86706 HEP B SURFACE ANTIBODY: CPT | Performed by: NURSE PRACTITIONER

## 2019-07-08 PROCEDURE — 87340 HEPATITIS B SURFACE AG IA: CPT | Performed by: NURSE PRACTITIONER

## 2019-07-08 PROCEDURE — 84550 ASSAY OF BLOOD/URIC ACID: CPT | Performed by: NURSE PRACTITIONER

## 2019-07-08 RX ORDER — SILDENAFIL CITRATE 20 MG/1
60 TABLET ORAL DAILY
Qty: 30 TABLET | Refills: 0 | OUTPATIENT
Start: 2019-07-08

## 2019-07-08 RX ORDER — ALLOPURINOL 100 MG/1
100 TABLET ORAL 2 TIMES DAILY
Qty: 60 TABLET | Refills: 5 | Status: SHIPPED | OUTPATIENT
Start: 2019-07-08 | End: 2019-12-30 | Stop reason: SDUPTHER

## 2019-07-08 RX ORDER — CLONAZEPAM 0.5 MG/1
0.5 TABLET ORAL 2 TIMES DAILY PRN
Qty: 15 TABLET | Refills: 0 | Status: SHIPPED | OUTPATIENT
Start: 2019-07-08 | End: 2019-12-30 | Stop reason: SDUPTHER

## 2019-07-08 RX ORDER — FAMCICLOVIR 500 MG/1
500 TABLET ORAL 2 TIMES DAILY
Qty: 60 TABLET | Refills: 5 | Status: SHIPPED | OUTPATIENT
Start: 2019-07-08 | End: 2019-12-30 | Stop reason: SDUPTHER

## 2019-07-08 RX ORDER — FAMCICLOVIR 500 MG/1
TABLET ORAL
Qty: 20 TABLET | Refills: 0 | OUTPATIENT
Start: 2019-07-08

## 2019-07-08 RX ORDER — SILDENAFIL CITRATE 20 MG/1
60 TABLET ORAL DAILY
Qty: 30 TABLET | Refills: 3 | Status: SHIPPED | OUTPATIENT
Start: 2019-07-08 | End: 2019-12-30 | Stop reason: SDUPTHER

## 2019-07-08 RX ORDER — BUSPIRONE HYDROCHLORIDE 5 MG/1
5 TABLET ORAL 3 TIMES DAILY
Qty: 90 TABLET | Refills: 5 | Status: SHIPPED | OUTPATIENT
Start: 2019-07-08 | End: 2019-12-30 | Stop reason: SDUPTHER

## 2019-07-08 NOTE — PROGRESS NOTES
Subjective   Barrington Chao is a 53 y.o. male.     Chief Complaint   Patient presents with   • Anxiety       History of Present Illness:    Anxiety-patient is requesting something to help with his anxiety.  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  At times he cannot sleep before having a panic attack and overcoming worry.  In the past he has taken benzodiazepines for acute episodes of anxiety.  He has failed Lexapro in the past.  Patient declines behavioral health consult.    Gout -Stable with allopurinol      Elevated liver enzymes - history of . No alcohol consumption.  Is requesting a repeat hepatitis panel.    Dysuria -has a little burning with urination for the last week.  Mild intensity.  History of UTI.    GERD-stable with over-the-counter PPI.    Erectile dysfunction-stable with use of  Sildenafil.    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    Patient is excited about the upcoming birth of his second grandchild.      The following portions of the patient's history were reviewed and updated as appropriate:  Allergies, current medications, past family history, past medical history, past social history, past surgical history and problem list.    Review of Systems   Constitutional: Negative for appetite change, fatigue and unexpected weight change.   HENT: Negative for congestion, ear pain, nosebleeds, postnasal drip, rhinorrhea, sore throat, trouble swallowing and voice change.    Eyes: Negative for pain and visual disturbance.   Respiratory: Negative for cough, shortness of breath and wheezing.    Cardiovascular: Negative for chest pain and palpitations.   Gastrointestinal: Negative for abdominal pain, blood in stool, constipation and diarrhea.   Endocrine: Negative for cold intolerance and polydipsia.   Genitourinary: Positive for dysuria. Negative for difficulty urinating, flank pain and hematuria.   Musculoskeletal: Positive for arthralgias. Negative for back  "pain, gait problem, joint swelling and myalgias.   Skin: Negative for color change and rash.   Allergic/Immunologic: Negative.    Neurological: Negative for syncope, numbness and headaches.   Hematological: Negative.    Psychiatric/Behavioral: Positive for sleep disturbance. Negative for agitation, confusion, decreased concentration, dysphoric mood, self-injury and suicidal ideas. The patient is nervous/anxious.    All other systems reviewed and are negative.      Objective     /80   Pulse 66   Temp 97.4 °F (36.3 °C) (Tympanic)   Ht 185.4 cm (72.99\")   Wt 88.5 kg (195 lb)   SpO2 99%   BMI 25.73 kg/m²   Office Visit on 07/19/2018   Component Date Value Ref Range Status   • Color 07/19/2018 Amy  Yellow, Straw, Dark Yellow, Amy Final   • Clarity, UA 07/19/2018 Clear  Clear Final   • Specific Gravity  07/19/2018 1.025  1.005 - 1.030 Final   • pH, Urine 07/19/2018 6.0  5.0 - 8.0 Final   • Leukocytes 07/19/2018 Negative  Negative Final   • Nitrite, UA 07/19/2018 Negative  Negative Final   • Protein, POC 07/19/2018 Negative  Negative mg/dL Final   • Glucose, UA 07/19/2018 Negative  Negative, 1000 mg/dL (3+) mg/dL Final   • Ketones, UA 07/19/2018 Negative  Negative Final   • Urobilinogen, UA 07/19/2018 Normal  Normal Final   • Bilirubin 07/19/2018 Negative  Negative Final   • Blood, UA 07/19/2018 Negative  Negative Final   • HIV-1/ HIV-2 07/19/2018 Non-Reactive  Non-Reactive Final   • Glucose 07/19/2018 91  70 - 110 mg/dL Final   • BUN 07/19/2018 25* 7 - 21 mg/dL Final   • Creatinine 07/19/2018 1.50* 0.43 - 1.29 mg/dL Final   • Sodium 07/19/2018 140  135 - 153 mmol/L Final   • Potassium 07/19/2018 4.3  3.5 - 5.3 mmol/L Final   • Chloride 07/19/2018 106  99 - 112 mmol/L Final   • CO2 07/19/2018 27.3  24.3 - 31.9 mmol/L Final   • Calcium 07/19/2018 9.1  7.7 - 10.0 mg/dL Final   • Total Protein 07/19/2018 7.3  6.0 - 8.0 g/dL Final   • Albumin 07/19/2018 4.40  3.50 - 5.00 g/dL Final   • ALT (SGPT) 07/19/2018 99* " 10 - 44 U/L Final   • AST (SGOT) 07/19/2018 80* 10 - 34 U/L Final   • Alkaline Phosphatase 07/19/2018 113  40 - 129 U/L Final   • Total Bilirubin 07/19/2018 0.4  0.2 - 1.8 mg/dL Final   • eGFR Non African Amer 07/19/2018 49* >60 mL/min/1.73 Final   • Globulin 07/19/2018 2.9  gm/dL Final   • A/G Ratio 07/19/2018 1.5  1.5 - 2.5 g/dL Final   • BUN/Creatinine Ratio 07/19/2018 16.7  7.0 - 25.0 Final   • Anion Gap 07/19/2018 6.7  3.6 - 11.2 mmol/L Final   • Hep A Total Ab 07/19/2018 Negative  Negative Final   • Hepatitis C Ab 07/19/2018 Non-Reactive  Non-Reactive Final   • Hep B Core Total Ab 07/19/2018 Negative  Negative Final   • Hep B S Ab 07/19/2018 Non-Reactive  Non-Reactive Final   • Hepatitis B Surface Ag 07/19/2018 Non-Reactive  Non-Reactive Final   • TSH 07/19/2018 1.939  0.550 - 4.780 mIU/mL Final   • Osmolality Calc 07/19/2018 283.4  273.0 - 305.0 mOsm/kg Final       Physical Exam   Constitutional: He is oriented to person, place, and time. Vital signs are normal. He appears well-developed and well-nourished. No distress.   Friendly and talkative 53-year-old male.   HENT:   Head: Normocephalic.   Right Ear: External ear normal.   Left Ear: External ear normal.   Nose: Nose normal.   Mouth/Throat: Oropharynx is clear and moist. No oropharyngeal exudate.   Eyes: Conjunctivae, EOM and lids are normal. Pupils are equal, round, and reactive to light. Right eye exhibits no discharge. Left eye exhibits no discharge.   Neck: Normal range of motion. Neck supple. No tracheal deviation present. No thyromegaly present.   Cardiovascular: Normal rate, regular rhythm and normal heart sounds. Exam reveals no gallop and no friction rub.   No murmur heard.  Pulmonary/Chest: Effort normal and breath sounds normal. No respiratory distress. He has no wheezes. He has no rales. He exhibits no tenderness.   Abdominal: Soft. Normal appearance and bowel sounds are normal. He exhibits no distension and no mass. There is no tenderness.  There is no rebound and no guarding.   Musculoskeletal: Normal range of motion.   Lymphadenopathy:     He has no cervical adenopathy.   Neurological: He is alert and oriented to person, place, and time. He has normal reflexes.   CN 2-12 grossly intact    Skin: Skin is warm and dry. Capillary refill takes less than 2 seconds. No rash noted. He is not diaphoretic. No erythema.   Psychiatric: He has a normal mood and affect. His speech is normal and behavior is normal. Judgment and thought content normal. Cognition and memory are normal.   Vitals reviewed.      Assessment/Plan     Problem List Items Addressed This Visit        Digestive    GERD with esophagitis    Current Assessment & Plan     GERD diet reviewed.  Continue over-the-counter PPI.            Nervous and Auditory    Dysuria       Musculoskeletal and Integument    Arthritis    Overview     Chronic knee pain         Current Assessment & Plan     Body mechanics reviewed.  Patient may take over-the-counter Aleve or ibuprofen on an as-needed basis.            Genitourinary    Renal insufficiency       Other    Anxiety - Primary    Current Assessment & Plan     Goal: pt will report improved anxiety symptoms.   As part of this patient's treatment plan they are being prescribed controlled substance/substances with potential for abuse. This patient has been made aware of the appropriate use of such medications, including potential risk of somnolence, limited ability to drive and / or work safely, and potential for overdose. It has also been made clear that these medications are for use by this patient only, without concomitant use of alcohol or other substances unless prescribed/advised by medical provider. Patient has completed controlled substance agreement detailing terms of continued prescribing of controlled substances including monitoring Joe reports, drug screens and pill counts. The patient was asked and states they are not receiving narcotic medication  from any there provider or any provider that this office has not been made aware of. History and physical exam exhibit continued safe and appropriate use of controlled substances.   Joe #[id number] has been reviewed as consistent.  UDS is on file.     Patient will be provided with a limited number of Klonopin 0.5 mg which he may take twice a day as needed for panic attack #15 with no refill.  We will start BuSpar 5 mg 3 times a day.  Recommend patient be seen by behavioral health.  Patient declines.         Relevant Medications    busPIRone (BUSPAR) 5 MG tablet    clonazePAM (KlonoPIN) 0.5 MG tablet    Other Relevant Orders    Urine Drug Screen - Urine, Clean Catch    CBC & Differential    Comprehensive Metabolic Panel    TSH    Hemoglobin A1c    Vitamin D 25 Hydroxy    Vitamin B12    Lipid Panel    Urinalysis With Microscopic If Indicated (No Culture) - Urine, Clean Catch    Hepatitis B surface antigen    Hepatitis B surface antibody    Hepatitis B core antibody, total    Hepatitis A antibody, total    Hepatitis C antibody    Elevated liver enzymes    Current Assessment & Plan     Avoid alcohol and Tylenol.         Relevant Orders    Hepatitis B surface antigen    Hepatitis B surface antibody    Hepatitis B core antibody, total    Hepatitis A antibody, total    Hepatitis C antibody      Other Visit Diagnoses     Erectile dysfunction, unspecified erectile dysfunction type        Relevant Medications    sildenafil (REVATIO) 20 MG tablet    Other Relevant Orders    CBC & Differential    Comprehensive Metabolic Panel    TSH    Hemoglobin A1c    Vitamin D 25 Hydroxy    Vitamin B12    Lipid Panel    Urinalysis With Microscopic If Indicated (No Culture) - Urine, Clean Catch    Hepatitis B surface antigen    Hepatitis B surface antibody    Hepatitis B core antibody, total    Hepatitis A antibody, total    Hepatitis C antibody    Chronic gout of multiple sites, unspecified cause        Relevant Medications    allopurinol  (ZYLOPRIM) 100 MG tablet    Other Relevant Orders    CBC & Differential    Comprehensive Metabolic Panel    TSH    Hemoglobin A1c    Vitamin D 25 Hydroxy    Vitamin B12    Lipid Panel    Urinalysis With Microscopic If Indicated (No Culture) - Urine, Clean Catch    Hepatitis B surface antigen    Hepatitis B surface antibody    Hepatitis B core antibody, total    Hepatitis A antibody, total    Hepatitis C antibody    Uric Acid    Cold sore        Relevant Medications    famciclovir (FAMVIR) 500 MG tablet            Labs today.       Patient's Body mass index is 25.73 kg/m². BMI is above normal parameters. Recommendations include: exercise counseling and nutrition counseling.      I have discussed diagnosis in detail today allowing time for questions and answers. Patient is aware of reasons to seek urgent or emergent medical care as well as reasons to return to the clinic for evaluation. Possible side effects, interactions and progression of symptoms discussed as well. Patient / family states understanding.   Emotional support and active listening provided.       Follow up in 3-6 months. Routine labs fasting one week prior to next office visit. Return sooner if needed.     Dictated utilizing Dragon dictation          This document has been electronically signed by:  DIPTI Jiménez, NP-C

## 2019-07-08 NOTE — ASSESSMENT & PLAN NOTE
Body mechanics reviewed.  Patient may take over-the-counter Aleve or ibuprofen on an as-needed basis.

## 2019-07-08 NOTE — ASSESSMENT & PLAN NOTE
Goal: pt will report improved anxiety symptoms.   As part of this patient's treatment plan they are being prescribed controlled substance/substances with potential for abuse. This patient has been made aware of the appropriate use of such medications, including potential risk of somnolence, limited ability to drive and / or work safely, and potential for overdose. It has also been made clear that these medications are for use by this patient only, without concomitant use of alcohol or other substances unless prescribed/advised by medical provider. Patient has completed controlled substance agreement detailing terms of continued prescribing of controlled substances including monitoring Joe reports, drug screens and pill counts. The patient was asked and states they are not receiving narcotic medication from any there provider or any provider that this office has not been made aware of. History and physical exam exhibit continued safe and appropriate use of controlled substances.   Joe #[id number] has been reviewed as consistent.  UDS is on file.     Patient will be provided with a limited number of Klonopin 0.5 mg which he may take twice a day as needed for panic attack #15 with no refill.  We will start BuSpar 5 mg 3 times a day.  Recommend patient be seen by behavioral health.  Patient declines.

## 2019-07-09 LAB
HAV AB SER QL IA: NEGATIVE
HBV CORE AB SER DONR QL IA: NEGATIVE

## 2019-07-15 ENCOUNTER — TELEPHONE (OUTPATIENT)
Dept: FAMILY MEDICINE CLINIC | Facility: CLINIC | Age: 54
End: 2019-07-15

## 2019-07-15 DIAGNOSIS — N28.9 RENAL INSUFFICIENCY: Primary | ICD-10-CM

## 2019-07-15 NOTE — TELEPHONE ENCOUNTER
----- Message from DIPTI Perez sent at 7/10/2019  1:42 PM EDT -----  Patient has low GFR and high creatinine.  I want him to stop all anti-inflammatory medication and repeat CMP in 4 weeks.  Let him know this is very important as this has to do with his renal function.    Spoke with corine and ordered his labs to do in 4 weeks

## 2019-09-06 DIAGNOSIS — F41.9 ANXIETY: ICD-10-CM

## 2019-09-07 RX ORDER — CLONAZEPAM 0.5 MG/1
TABLET ORAL
Qty: 15 TABLET | Refills: 0 | OUTPATIENT
Start: 2019-09-07

## 2019-09-10 DIAGNOSIS — F41.9 ANXIETY: ICD-10-CM

## 2019-09-10 RX ORDER — CLONAZEPAM 0.5 MG/1
TABLET ORAL
Qty: 15 TABLET | Refills: 0 | OUTPATIENT
Start: 2019-09-10

## 2019-09-11 DIAGNOSIS — F41.9 ANXIETY: ICD-10-CM

## 2019-09-11 RX ORDER — CLONAZEPAM 0.5 MG/1
TABLET ORAL
Qty: 15 TABLET | Refills: 0 | OUTPATIENT
Start: 2019-09-11

## 2019-10-12 DIAGNOSIS — K21.00 GERD WITH ESOPHAGITIS: ICD-10-CM

## 2019-10-14 RX ORDER — OMEPRAZOLE 20 MG/1
CAPSULE, DELAYED RELEASE ORAL
Qty: 30 CAPSULE | Refills: 0 | Status: SHIPPED | OUTPATIENT
Start: 2019-10-14 | End: 2019-12-30

## 2019-11-11 DIAGNOSIS — N52.9 ERECTILE DYSFUNCTION, UNSPECIFIED ERECTILE DYSFUNCTION TYPE: ICD-10-CM

## 2019-11-11 RX ORDER — SILDENAFIL CITRATE 20 MG/1
60 TABLET ORAL DAILY
Qty: 30 TABLET | Refills: 0 | OUTPATIENT
Start: 2019-11-11

## 2019-12-30 ENCOUNTER — RESULTS ENCOUNTER (OUTPATIENT)
Dept: FAMILY MEDICINE CLINIC | Facility: CLINIC | Age: 54
End: 2019-12-30

## 2019-12-30 ENCOUNTER — OFFICE VISIT (OUTPATIENT)
Dept: FAMILY MEDICINE CLINIC | Facility: CLINIC | Age: 54
End: 2019-12-30

## 2019-12-30 VITALS
HEART RATE: 62 BPM | HEIGHT: 73 IN | OXYGEN SATURATION: 98 % | SYSTOLIC BLOOD PRESSURE: 130 MMHG | BODY MASS INDEX: 26.11 KG/M2 | WEIGHT: 197 LBS | DIASTOLIC BLOOD PRESSURE: 80 MMHG | TEMPERATURE: 98.5 F

## 2019-12-30 DIAGNOSIS — R74.8 ELEVATED LIVER ENZYMES: ICD-10-CM

## 2019-12-30 DIAGNOSIS — N52.9 ERECTILE DYSFUNCTION, UNSPECIFIED ERECTILE DYSFUNCTION TYPE: ICD-10-CM

## 2019-12-30 DIAGNOSIS — B00.1 COLD SORE: ICD-10-CM

## 2019-12-30 DIAGNOSIS — Z12.11 SCREENING FOR COLON CANCER: ICD-10-CM

## 2019-12-30 DIAGNOSIS — F41.9 ANXIETY: Primary | ICD-10-CM

## 2019-12-30 DIAGNOSIS — B00.1 FEVER BLISTER: ICD-10-CM

## 2019-12-30 DIAGNOSIS — M1A.09X0 CHRONIC GOUT OF MULTIPLE SITES, UNSPECIFIED CAUSE: ICD-10-CM

## 2019-12-30 DIAGNOSIS — N28.9 RENAL INSUFFICIENCY: ICD-10-CM

## 2019-12-30 PROBLEM — R30.0 DYSURIA: Status: RESOLVED | Noted: 2019-07-08 | Resolved: 2019-12-30

## 2019-12-30 PROCEDURE — 99214 OFFICE O/P EST MOD 30 MIN: CPT | Performed by: NURSE PRACTITIONER

## 2019-12-30 RX ORDER — BUSPIRONE HYDROCHLORIDE 5 MG/1
5 TABLET ORAL 3 TIMES DAILY
Qty: 90 TABLET | Refills: 5 | Status: SHIPPED | OUTPATIENT
Start: 2019-12-30 | End: 2020-06-29 | Stop reason: SDUPTHER

## 2019-12-30 RX ORDER — FAMCICLOVIR 500 MG/1
500 TABLET ORAL 2 TIMES DAILY
Qty: 60 TABLET | Refills: 5 | Status: SHIPPED | OUTPATIENT
Start: 2019-12-30 | End: 2020-06-29 | Stop reason: SDUPTHER

## 2019-12-30 RX ORDER — ALLOPURINOL 100 MG/1
100 TABLET ORAL 2 TIMES DAILY
Qty: 60 TABLET | Refills: 5 | Status: SHIPPED | OUTPATIENT
Start: 2019-12-30 | End: 2020-06-29 | Stop reason: SDUPTHER

## 2019-12-30 RX ORDER — SILDENAFIL CITRATE 20 MG/1
60 TABLET ORAL DAILY
Qty: 30 TABLET | Refills: 3 | Status: SHIPPED | OUTPATIENT
Start: 2019-12-30 | End: 2020-04-20

## 2019-12-30 RX ORDER — CLONAZEPAM 0.5 MG/1
0.5 TABLET ORAL 2 TIMES DAILY PRN
Qty: 30 TABLET | Refills: 0 | Status: SHIPPED | OUTPATIENT
Start: 2019-12-30 | End: 2020-06-29

## 2019-12-30 NOTE — PROGRESS NOTES
Subjective   Barrington Chao is a 54 y.o. male.     Chief Complaint   Patient presents with   • Anxiety   Med refills  Wood like to talk about screening labs    History of Present Illness:    54-year-old male here today requesting medication refills on routine medication.    Anxiety-patient is a long distance .  At times he has panic attacks.  Panic attacks usually occur not when driving but when away from home.  Has panic attacks randomly.  Patient is failed multiple antidepressants and anti-anxiety medications.  He currently receives BuSpar which he takes routinely 3 times a day.  He only takes Klonopin during acute panic attacks.    Recurrent fever blisters/cold sores- patient is currently taking Famvir twice daily which is helping to control his fever blisters.  His wife also has recurrent fever blisters and they both take Famvir at this time.    GERD- improved with weight loss and lifestyle changes.    Arthritis of multiple sites-patient has low back, knee and hip pain due to sitting for long periods of time.  He takes an over-the-counter Tylenol or ibuprofen on as-needed basis.    Elevated liver enzymes- we will repeat labs while he is off over the holiday season.    Previous renal insufficiency-was improved with diet changes/lifestyle changes.    Gout-no recent exacerbation.  Currently receives allopurinol.    Erectile dysfunction-patient receives sildenafil.  Reports that this helps with his erectile dysfunction as well as to maintain blood pressure.  Patient monitors blood pressure randomly and reports readings have been within acceptable ranges.        The following portions of the patient's history, chief complaint and ROS were reviewed and updated as appropriate per provider:  Allergies, current medications, past family history, past medical history, past social history, past surgical history and problem list.    Review of Systems   Constitutional: Negative for activity change, appetite change,  "chills, diaphoresis, fatigue and unexpected weight change.   HENT: Positive for mouth sores (Fever blisters). Negative for congestion, ear pain, nosebleeds, postnasal drip, rhinorrhea, sinus pressure, sinus pain, sneezing, sore throat, trouble swallowing and voice change.    Eyes: Negative for photophobia, pain, discharge, redness, itching and visual disturbance.   Respiratory: Negative for cough, choking, chest tightness, shortness of breath, wheezing and stridor.    Cardiovascular: Negative for chest pain, palpitations and leg swelling.   Gastrointestinal: Negative for abdominal pain, constipation, diarrhea, nausea, rectal pain and vomiting.   Endocrine: Negative for cold intolerance, heat intolerance, polydipsia, polyphagia and polyuria.   Genitourinary: Negative for difficulty urinating, dysuria, flank pain, frequency, hematuria and urgency.   Musculoskeletal: Negative for arthralgias, back pain, gait problem, joint swelling and myalgias.   Skin: Negative for color change, pallor, rash and wound.   Allergic/Immunologic: Negative.  Negative for environmental allergies, food allergies and immunocompromised state.   Neurological: Negative for tremors, seizures, syncope, facial asymmetry, speech difficulty, weakness, light-headedness, numbness and headaches.   Hematological: Negative.  Does not bruise/bleed easily.   Psychiatric/Behavioral: Negative for agitation, behavioral problems, dysphoric mood, sleep disturbance and suicidal ideas. The patient is nervous/anxious.    All other systems reviewed and are negative.      Objective     /80   Pulse 62   Temp 98.5 °F (36.9 °C) (Temporal)   Ht 185.4 cm (72.99\")   Wt 89.4 kg (197 lb)   SpO2 98%   BMI 26.00 kg/m²   Office Visit on 07/08/2019   Component Date Value Ref Range Status   • Glucose 07/08/2019 101* 65 - 99 mg/dL Final   • BUN 07/08/2019 17  6 - 20 mg/dL Final   • Creatinine 07/08/2019 1.33* 0.76 - 1.27 mg/dL Final   • Sodium 07/08/2019 142  136 - 145 " mmol/L Final   • Potassium 07/08/2019 4.7  3.5 - 5.2 mmol/L Final   • Chloride 07/08/2019 103  98 - 107 mmol/L Final   • CO2 07/08/2019 27.2  22.0 - 29.0 mmol/L Final   • Calcium 07/08/2019 9.3  8.6 - 10.5 mg/dL Final   • Total Protein 07/08/2019 7.2  6.0 - 8.5 g/dL Final   • Albumin 07/08/2019 4.20  3.50 - 5.20 g/dL Final   • ALT (SGPT) 07/08/2019 32  1 - 41 U/L Final   • AST (SGOT) 07/08/2019 28  1 - 40 U/L Final   • Alkaline Phosphatase 07/08/2019 63  39 - 117 U/L Final   • Total Bilirubin 07/08/2019 0.4  0.2 - 1.2 mg/dL Final   • eGFR Non  Amer 07/08/2019 56* >60 mL/min/1.73 Final   • Globulin 07/08/2019 3.0  gm/dL Final   • A/G Ratio 07/08/2019 1.4  g/dL Final   • BUN/Creatinine Ratio 07/08/2019 12.8  7.0 - 25.0 Final   • Anion Gap 07/08/2019 11.8  5.0 - 15.0 mmol/L Final   • TSH 07/08/2019 3.000  0.270 - 4.200 mIU/mL Final   • Hemoglobin A1C 07/08/2019 5.61* 4.80 - 5.60 % Final   • 25 Hydroxy, Vitamin D 07/08/2019 54.3  30.0 - 100.0 ng/ml Final   • Vitamin B-12 07/08/2019 1,356* 211 - 946 pg/mL Final   • Total Cholesterol 07/08/2019 156  0 - 200 mg/dL Final   • Triglycerides 07/08/2019 110  0 - 150 mg/dL Final   • HDL Cholesterol 07/08/2019 35* 40 - 60 mg/dL Final   • LDL Cholesterol  07/08/2019 99  0 - 100 mg/dL Final   • VLDL Cholesterol 07/08/2019 22  5 - 40 mg/dL Final   • LDL/HDL Ratio 07/08/2019 2.83   Final   • Color, UA 07/08/2019 Yellow  Yellow, Straw Final   • Appearance, UA 07/08/2019 Clear  Clear Final   • pH, UA 07/08/2019 6.5  5.0 - 8.0 Final   • Specific Gravity, UA 07/08/2019 1.015  1.005 - 1.030 Final   • Glucose, UA 07/08/2019 Negative  Negative Final   • Ketones, UA 07/08/2019 Negative  Negative Final   • Bilirubin, UA 07/08/2019 Negative  Negative Final   • Blood, UA 07/08/2019 Trace* Negative Final   • Protein, UA 07/08/2019 Negative  Negative Final   • Leuk Esterase, UA 07/08/2019 Negative  Negative Final   • Nitrite, UA 07/08/2019 Negative  Negative Final   • Urobilinogen, UA  07/08/2019 0.2 E.U./dL  0.2 - 1.0 E.U./dL Final   • Hepatitis B Surface Ag 07/08/2019 Non-Reactive  Non-Reactive Final   • Hep B S Ab 07/08/2019 Non-Reactive  Non-Reactive Final   • Hep B Core Total Ab 07/08/2019 Negative  Negative Final   • Hep A Total Ab 07/08/2019 Negative  Negative Final   • Hepatitis C Ab 07/08/2019 Non-Reactive  Non-Reactive Final   • Uric Acid 07/08/2019 7.5* 3.4 - 7.0 mg/dL Final   • RBC, UA 07/08/2019 0-2  None Seen, 0-2 /HPF Final   • WBC, UA 07/08/2019 None Seen  None Seen, 0-2 /HPF Final   • Bacteria, UA 07/08/2019 None Seen  None Seen /HPF Final   • Squamous Epithelial Cells, UA 07/08/2019 None Seen  None Seen, 0-2 /HPF Final   • Hyaline Casts, UA 07/08/2019 None Seen  None Seen /LPF Final   • Methodology 07/08/2019 Automated Microscopy   Final       Physical Exam   Constitutional: He is oriented to person, place, and time. Vital signs are normal. He appears well-developed and well-nourished. No distress.   Talkative and friendly adult male in no acute distress.   HENT:   Head: Normocephalic.   Right Ear: Tympanic membrane, external ear and ear canal normal.   Left Ear: Tympanic membrane, external ear and ear canal normal.   Nose: Nose normal. No mucosal edema. Right sinus exhibits no maxillary sinus tenderness and no frontal sinus tenderness. Left sinus exhibits no maxillary sinus tenderness and no frontal sinus tenderness.   Mouth/Throat: Uvula is midline, oropharynx is clear and moist and mucous membranes are normal. Mucous membranes are not pale and not dry. Normal dentition. No oropharyngeal exudate.   Eyes: Pupils are equal, round, and reactive to light. Conjunctivae, EOM and lids are normal. Right eye exhibits no discharge. Left eye exhibits no discharge.   Neck: Normal range of motion. Neck supple. No tracheal deviation present. No thyromegaly present.   Cardiovascular: Normal rate, regular rhythm and normal heart sounds. Exam reveals no gallop and no friction rub.   No murmur  heard.  Pulmonary/Chest: Effort normal and breath sounds normal. No respiratory distress. He has no wheezes. He has no rales. He exhibits no tenderness.   Abdominal: Soft. Normal appearance and bowel sounds are normal. He exhibits no distension and no mass. There is no tenderness. There is no rebound and no guarding.   Musculoskeletal: Normal range of motion.   Lymphadenopathy:     He has no cervical adenopathy.   Neurological: He is alert and oriented to person, place, and time. He has normal reflexes.   CN 2-12 grossly intact    Skin: Skin is warm and dry. Capillary refill takes less than 2 seconds. No rash noted. He is not diaphoretic. No erythema.   Psychiatric: He has a normal mood and affect. His speech is normal and behavior is normal. Judgment and thought content normal. Cognition and memory are normal.   Vitals reviewed.      Assessment/Plan     Problem List Items Addressed This Visit        Genitourinary    Renal insufficiency    Relevant Orders    Comprehensive Metabolic Panel       Other    Anxiety - Primary    Current Assessment & Plan     Goal: pt will report improved anxiety symptoms.   As part of this patient's treatment plan they are being prescribed controlled substance/substances with potential for abuse. This patient has been made aware of the appropriate use of such medications, including potential risk of somnolence, limited ability to drive and / or work safely, and potential for overdose. It has also been made clear that these medications are for use by this patient only, without concomitant use of alcohol or other substances unless prescribed/advised by medical provider. Patient has completed controlled substance agreement detailing terms of continued prescribing of controlled substances including monitoring Joe reports, drug screens and pill counts. The patient was asked and states they are not receiving narcotic medication from any there provider or any provider that this office has not  been made aware of. History and physical exam exhibit continued safe and appropriate use of controlled substances.   Pt is at low risk for substance abuse or addiction. Pt will be monitored closely for compliance and the need to continue plan of care.   Patient may take Klonopin 0.5 mg twice daily.  Prescription provided for a #30 with no refill.  Urine drug screen will be obtained today.  Continue BuSpar         Relevant Medications    clonazePAM (KlonoPIN) 0.5 MG tablet    busPIRone (BUSPAR) 5 MG tablet    Other Relevant Orders    Urine Drug Screen - Urine, Clean Catch    CBC & Differential    Comprehensive Metabolic Panel    TSH    Hemoglobin A1c    Vitamin D 25 Hydroxy    Vitamin B12    PSA Screen    Lipid Panel    Uric Acid    Elevated liver enzymes    Relevant Orders    Comprehensive Metabolic Panel    Fever blister    Relevant Medications    famciclovir (FAMVIR) 500 MG tablet    Other Relevant Orders    HSV 1 & 2 - Specific Antibody, IgG      Other Visit Diagnoses     Chronic gout of multiple sites, unspecified cause        Relevant Medications    allopurinol (ZYLOPRIM) 100 MG tablet    Other Relevant Orders    CBC & Differential    Comprehensive Metabolic Panel    TSH    Hemoglobin A1c    Vitamin D 25 Hydroxy    Vitamin B12    PSA Screen    Lipid Panel    Uric Acid    Cold sore        Relevant Medications    famciclovir (FAMVIR) 500 MG tablet    Other Relevant Orders    CBC & Differential    Comprehensive Metabolic Panel    TSH    Hemoglobin A1c    Vitamin D 25 Hydroxy    Vitamin B12    PSA Screen    Lipid Panel    Uric Acid    HSV 1 & 2 - Specific Antibody, IgG    Erectile dysfunction, unspecified erectile dysfunction type        Relevant Medications    sildenafil (REVATIO) 20 MG tablet    Other Relevant Orders    CBC & Differential    Comprehensive Metabolic Panel    TSH    Hemoglobin A1c    Vitamin D 25 Hydroxy    Vitamin B12    PSA Screen    Lipid Panel    Uric Acid    Screening for colon cancer         Relevant Orders    Cologuard - Stool, Per Rectum            Current Outpatient Medications:   •  allopurinol (ZYLOPRIM) 100 MG tablet, Take 1 tablet by mouth 2 (Two) Times a Day., Disp: 60 tablet, Rfl: 5  •  busPIRone (BUSPAR) 5 MG tablet, Take 1 tablet by mouth 3 (Three) Times a Day., Disp: 90 tablet, Rfl: 5  •  clonazePAM (KlonoPIN) 0.5 MG tablet, Take 1 tablet by mouth 2 (Two) Times a Day As Needed for Anxiety., Disp: 30 tablet, Rfl: 0  •  famciclovir (FAMVIR) 500 MG tablet, Take 1 tablet by mouth 2 (Two) Times a Day., Disp: 60 tablet, Rfl: 5  •  sildenafil (REVATIO) 20 MG tablet, Take 3 tablets by mouth Daily., Disp: 30 tablet, Rfl: 3    UDS and Joe requested today.         Patient's Body mass index is 26 kg/m². BMI is within normal parameters. No follow-up required.  BMI of 26 is considered normal for patient's body frame.    Diet discussed.    I have discussed diagnosis in detail today allowing time for questions and answers. Patient is aware of reasons to seek urgent or emergent medical care as well as reasons to return to the clinic for evaluation. Possible side effects, interactions and progression of symptoms discussed as well. Patient / family states understanding.   Emotional support and active listening provided.     We will schedule fasting labs for tomorrow as patient will be off from his  job.  Healthy diet and routine exercise has been encouraged.  Gout diet reviewed/discussed.  Avoid Tylenol products, alcohol.  Follow-up every 6 months, sooner if needed.  Labs at least every 6-12 months.          This document has been electronically signed by:  DIPTI Jiménez, NP-C

## 2019-12-30 NOTE — ASSESSMENT & PLAN NOTE
Goal: pt will report improved anxiety symptoms.   As part of this patient's treatment plan they are being prescribed controlled substance/substances with potential for abuse. This patient has been made aware of the appropriate use of such medications, including potential risk of somnolence, limited ability to drive and / or work safely, and potential for overdose. It has also been made clear that these medications are for use by this patient only, without concomitant use of alcohol or other substances unless prescribed/advised by medical provider. Patient has completed controlled substance agreement detailing terms of continued prescribing of controlled substances including monitoring Joe reports, drug screens and pill counts. The patient was asked and states they are not receiving narcotic medication from any there provider or any provider that this office has not been made aware of. History and physical exam exhibit continued safe and appropriate use of controlled substances.   Pt is at low risk for substance abuse or addiction. Pt will be monitored closely for compliance and the need to continue plan of care.   Patient may take Klonopin 0.5 mg twice daily.  Prescription provided for a #30 with no refill.  Urine drug screen will be obtained today.  Continue BuSpar

## 2019-12-31 ENCOUNTER — LAB (OUTPATIENT)
Dept: FAMILY MEDICINE CLINIC | Facility: CLINIC | Age: 54
End: 2019-12-31

## 2019-12-31 DIAGNOSIS — B00.1 FEVER BLISTER: ICD-10-CM

## 2019-12-31 DIAGNOSIS — M1A.09X0 CHRONIC GOUT OF MULTIPLE SITES, UNSPECIFIED CAUSE: ICD-10-CM

## 2019-12-31 DIAGNOSIS — N52.9 ERECTILE DYSFUNCTION, UNSPECIFIED ERECTILE DYSFUNCTION TYPE: ICD-10-CM

## 2019-12-31 DIAGNOSIS — B00.1 COLD SORE: Primary | ICD-10-CM

## 2019-12-31 DIAGNOSIS — B00.1 COLD SORE: ICD-10-CM

## 2019-12-31 DIAGNOSIS — K21.00 GERD WITH ESOPHAGITIS: ICD-10-CM

## 2019-12-31 DIAGNOSIS — R74.8 ELEVATED LIVER ENZYMES: ICD-10-CM

## 2019-12-31 DIAGNOSIS — N28.9 RENAL INSUFFICIENCY: ICD-10-CM

## 2019-12-31 DIAGNOSIS — F41.9 ANXIETY: ICD-10-CM

## 2019-12-31 RX ORDER — SILDENAFIL CITRATE 20 MG/1
60 TABLET ORAL DAILY
Qty: 30 TABLET | Refills: 0 | OUTPATIENT
Start: 2019-12-31

## 2020-01-02 RX ORDER — OMEPRAZOLE 20 MG/1
CAPSULE, DELAYED RELEASE ORAL
Qty: 30 CAPSULE | Refills: 11 | Status: SHIPPED | OUTPATIENT
Start: 2020-01-02 | End: 2020-06-29

## 2020-01-05 LAB
25(OH)D3+25(OH)D2 SERPL-MCNC: 36 NG/ML (ref 30–100)
ALBUMIN SERPL-MCNC: 4.6 G/DL (ref 3.5–5.2)
ALBUMIN/GLOB SERPL: 1.8 G/DL
ALP SERPL-CCNC: 73 U/L (ref 39–117)
ALT SERPL-CCNC: 49 U/L (ref 1–41)
APPEARANCE UR: CLEAR
AST SERPL-CCNC: 43 U/L (ref 1–40)
BACTERIA #/AREA URNS HPF: NORMAL /HPF
BASOPHILS # BLD AUTO: 0.11 10*3/MM3 (ref 0–0.2)
BASOPHILS NFR BLD AUTO: 1.6 % (ref 0–1.5)
BILIRUB SERPL-MCNC: 0.4 MG/DL (ref 0.2–1.2)
BILIRUB UR QL STRIP: NEGATIVE
BUN SERPL-MCNC: 21 MG/DL (ref 6–20)
BUN/CREAT SERPL: 17.4 (ref 7–25)
CALCIUM SERPL-MCNC: 8.9 MG/DL (ref 8.6–10.5)
CHLORIDE SERPL-SCNC: 101 MMOL/L (ref 98–107)
CHOLEST SERPL-MCNC: 179 MG/DL (ref 0–200)
CO2 SERPL-SCNC: 24.5 MMOL/L (ref 22–29)
COLOR UR: YELLOW
CREAT SERPL-MCNC: 1.21 MG/DL (ref 0.76–1.27)
EOSINOPHIL # BLD AUTO: 0.28 10*3/MM3 (ref 0–0.4)
EOSINOPHIL NFR BLD AUTO: 4 % (ref 0.3–6.2)
EPI CELLS #/AREA URNS HPF: NORMAL /HPF (ref 0–10)
ERYTHROCYTE [DISTWIDTH] IN BLOOD BY AUTOMATED COUNT: 13.1 % (ref 12.3–15.4)
GLOBULIN SER CALC-MCNC: 2.5 GM/DL
GLUCOSE SERPL-MCNC: 103 MG/DL (ref 65–99)
GLUCOSE UR QL: NEGATIVE
HBA1C MFR BLD: 5.6 % (ref 4.8–5.6)
HCT VFR BLD AUTO: 46.4 % (ref 37.5–51)
HDLC SERPL-MCNC: 39 MG/DL (ref 40–60)
HGB BLD-MCNC: 15.8 G/DL (ref 13–17.7)
HGB UR QL STRIP: NEGATIVE
HSV1 IGG SER IA-ACNC: 49 INDEX (ref 0–0.9)
HSV2 IGG SER IA-ACNC: 1.87 INDEX (ref 0–0.9)
HSV2 IGG SERPL QL IA: POSITIVE
IMM GRANULOCYTES # BLD AUTO: 0.03 10*3/MM3 (ref 0–0.05)
IMM GRANULOCYTES NFR BLD AUTO: 0.4 % (ref 0–0.5)
KETONES UR QL STRIP: NEGATIVE
LDLC SERPL CALC-MCNC: 116 MG/DL (ref 0–100)
LEUKOCYTE ESTERASE UR QL STRIP: NEGATIVE
LYMPHOCYTES # BLD AUTO: 1.58 10*3/MM3 (ref 0.7–3.1)
LYMPHOCYTES NFR BLD AUTO: 22.7 % (ref 19.6–45.3)
MCH RBC QN AUTO: 31 PG (ref 26.6–33)
MCHC RBC AUTO-ENTMCNC: 34.1 G/DL (ref 31.5–35.7)
MCV RBC AUTO: 91 FL (ref 79–97)
MICRO URNS: NORMAL
MICRO URNS: NORMAL
MONOCYTES # BLD AUTO: 0.66 10*3/MM3 (ref 0.1–0.9)
MONOCYTES NFR BLD AUTO: 9.5 % (ref 5–12)
NEUTROPHILS # BLD AUTO: 4.29 10*3/MM3 (ref 1.7–7)
NEUTROPHILS NFR BLD AUTO: 61.8 % (ref 42.7–76)
NITRITE UR QL STRIP: NEGATIVE
NRBC BLD AUTO-RTO: 0.1 /100 WBC (ref 0–0.2)
PH UR STRIP: 6 [PH] (ref 5–7.5)
PLATELET # BLD AUTO: 159 10*3/MM3 (ref 140–450)
POTASSIUM SERPL-SCNC: 4.3 MMOL/L (ref 3.5–5.2)
PROT SERPL-MCNC: 7.1 G/DL (ref 6–8.5)
PROT UR QL STRIP: NEGATIVE
PSA SERPL-MCNC: 2.87 NG/ML (ref 0–4)
RBC # BLD AUTO: 5.1 10*6/MM3 (ref 4.14–5.8)
RBC #/AREA URNS HPF: NORMAL /HPF (ref 0–2)
SODIUM SERPL-SCNC: 141 MMOL/L (ref 136–145)
SP GR UR: 1.01 (ref 1–1.03)
TESTOST FREE SERPL-MCNC: 18.1 PG/ML (ref 7.2–24)
TESTOST SERPL-MCNC: 582.6 NG/DL (ref 264–916)
TRIGL SERPL-MCNC: 121 MG/DL (ref 0–150)
TSH SERPL DL<=0.005 MIU/L-ACNC: 6.6 UIU/ML (ref 0.27–4.2)
URATE SERPL-MCNC: 7.9 MG/DL (ref 3.4–7)
URINALYSIS REFLEX: NORMAL
UROBILINOGEN UR STRIP-MCNC: 0.2 MG/DL (ref 0.2–1)
VIT B12 SERPL-MCNC: 1207 PG/ML (ref 211–946)
VLDLC SERPL CALC-MCNC: 24.2 MG/DL
WBC # BLD AUTO: 6.95 10*3/MM3 (ref 3.4–10.8)
WBC #/AREA URNS HPF: NORMAL /HPF (ref 0–5)

## 2020-01-06 ENCOUNTER — TELEPHONE (OUTPATIENT)
Dept: FAMILY MEDICINE CLINIC | Facility: CLINIC | Age: 55
End: 2020-01-06

## 2020-01-06 RX ORDER — LEVOTHYROXINE SODIUM 0.07 MG/1
75 TABLET ORAL DAILY
Qty: 30 TABLET | Refills: 5 | Status: SHIPPED | OUTPATIENT
Start: 2020-01-06 | End: 2020-06-29 | Stop reason: SDUPTHER

## 2020-01-06 NOTE — TELEPHONE ENCOUNTER
----- Message from DIPTI Perez sent at 1/6/2020 11:47 AM EST -----  Patient has several abnormal labs.  He had been skipping his allopurinol and is now in a gout exacerbation.  Encouraged the patient to stay away from meats and preserved foods and take his gout medicine as prescribed.  Also his LDL cholesterol is mildly elevated so he needs to work on heart healthy diet and exercise.  Patient's vitamin B 12 is elevated, he may stop any B12 supplementation he is taking.  His thyroid level shows he has a low function and will need to start on a medication to help regulate that.  Start him on Synthroid 75 mcg daily #30 with 2 refills.  He also needs to avoid any over-the-counter Tylenol products, alcohol and Tylenol-containing products.  We will repeat his CMP and thyroid function in 3 months.  Please let the patient know it is very important to be compliant with repeating these labs as he has some abnormal lab results including blood function and liver enzymes.

## 2020-01-29 ENCOUNTER — TELEPHONE (OUTPATIENT)
Dept: FAMILY MEDICINE CLINIC | Facility: CLINIC | Age: 55
End: 2020-01-29

## 2020-01-29 NOTE — TELEPHONE ENCOUNTER
----- Message from DIPTI Perez sent at 1/28/2020  3:57 PM EST -----  Please let the patient know that his Cologuard is negative    Sending a letter of her results

## 2020-02-18 DIAGNOSIS — F41.9 ANXIETY: ICD-10-CM

## 2020-02-18 RX ORDER — CLONAZEPAM 0.5 MG/1
TABLET ORAL
Qty: 30 TABLET | Refills: 0 | OUTPATIENT
Start: 2020-02-18

## 2020-04-20 DIAGNOSIS — N52.9 ERECTILE DYSFUNCTION, UNSPECIFIED ERECTILE DYSFUNCTION TYPE: ICD-10-CM

## 2020-04-20 RX ORDER — SILDENAFIL CITRATE 20 MG/1
60 TABLET ORAL DAILY
Qty: 30 TABLET | Refills: 0 | Status: SHIPPED | OUTPATIENT
Start: 2020-04-20 | End: 2020-06-29 | Stop reason: SDUPTHER

## 2020-06-29 ENCOUNTER — OFFICE VISIT (OUTPATIENT)
Dept: FAMILY MEDICINE CLINIC | Facility: CLINIC | Age: 55
End: 2020-06-29

## 2020-06-29 ENCOUNTER — RESULTS ENCOUNTER (OUTPATIENT)
Dept: FAMILY MEDICINE CLINIC | Facility: CLINIC | Age: 55
End: 2020-06-29

## 2020-06-29 VITALS
WEIGHT: 202 LBS | HEART RATE: 97 BPM | BODY MASS INDEX: 26.77 KG/M2 | HEIGHT: 73 IN | TEMPERATURE: 97.3 F | OXYGEN SATURATION: 97 % | SYSTOLIC BLOOD PRESSURE: 140 MMHG | DIASTOLIC BLOOD PRESSURE: 80 MMHG

## 2020-06-29 DIAGNOSIS — R74.8 ELEVATED LIVER ENZYMES: ICD-10-CM

## 2020-06-29 DIAGNOSIS — F41.9 ANXIETY: ICD-10-CM
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
## 2020-06-29 DIAGNOSIS — N52.9 ERECTILE DYSFUNCTION, UNSPECIFIED ERECTILE DYSFUNCTION TYPE: ICD-10-CM

## 2020-06-29 DIAGNOSIS — B00.1 COLD SORE: ICD-10-CM

## 2020-06-29 DIAGNOSIS — M15.9 OSTEOARTHRITIS OF MULTIPLE JOINTS, UNSPECIFIED OSTEOARTHRITIS TYPE: ICD-10-CM

## 2020-06-29 DIAGNOSIS — Z29.9 PREVENTIVE MEASURE: ICD-10-CM

## 2020-06-29 DIAGNOSIS — N28.9 RENAL INSUFFICIENCY: ICD-10-CM

## 2020-06-29 DIAGNOSIS — K21.00 GERD WITH ESOPHAGITIS: Primary | ICD-10-CM

## 2020-06-29 DIAGNOSIS — M1A.09X0 CHRONIC GOUT OF MULTIPLE SITES, UNSPECIFIED CAUSE: ICD-10-CM

## 2020-06-29 DIAGNOSIS — Z72.51 HIGH RISK SEXUAL BEHAVIOR, UNSPECIFIED TYPE: ICD-10-CM

## 2020-06-29 DIAGNOSIS — Z83.1 FAMILY HISTORY OF WORMS: ICD-10-CM

## 2020-06-29 DIAGNOSIS — E03.9 ACQUIRED HYPOTHYROIDISM: ICD-10-CM

## 2020-06-29 DIAGNOSIS — R35.0 FREQUENT URINATION: ICD-10-CM

## 2020-06-29 LAB
C TRACH RRNA CVX QL NAA+PROBE: NOT DETECTED
N GONORRHOEA RRNA SPEC QL NAA+PROBE: NOT DETECTED

## 2020-06-29 PROCEDURE — 84443 ASSAY THYROID STIM HORMONE: CPT | Performed by: NURSE PRACTITIONER

## 2020-06-29 PROCEDURE — 82607 VITAMIN B-12: CPT | Performed by: NURSE PRACTITIONER

## 2020-06-29 PROCEDURE — 87591 N.GONORRHOEAE DNA AMP PROB: CPT | Performed by: NURSE PRACTITIONER

## 2020-06-29 PROCEDURE — 87340 HEPATITIS B SURFACE AG IA: CPT | Performed by: NURSE PRACTITIONER

## 2020-06-29 PROCEDURE — 86592 SYPHILIS TEST NON-TREP QUAL: CPT | Performed by: NURSE PRACTITIONER

## 2020-06-29 PROCEDURE — 86618 LYME DISEASE ANTIBODY: CPT | Performed by: NURSE PRACTITIONER

## 2020-06-29 PROCEDURE — 80061 LIPID PANEL: CPT | Performed by: NURSE PRACTITIONER

## 2020-06-29 PROCEDURE — 82043 UR ALBUMIN QUANTITATIVE: CPT | Performed by: NURSE PRACTITIONER

## 2020-06-29 PROCEDURE — G0432 EIA HIV-1/HIV-2 SCREEN: HCPCS | Performed by: NURSE PRACTITIONER

## 2020-06-29 PROCEDURE — 86708 HEPATITIS A ANTIBODY: CPT | Performed by: NURSE PRACTITIONER

## 2020-06-29 PROCEDURE — 83825 ASSAY OF MERCURY: CPT | Performed by: NURSE PRACTITIONER

## 2020-06-29 PROCEDURE — 83036 HEMOGLOBIN GLYCOSYLATED A1C: CPT | Performed by: NURSE PRACTITIONER

## 2020-06-29 PROCEDURE — 85025 COMPLETE CBC W/AUTO DIFF WBC: CPT | Performed by: NURSE PRACTITIONER

## 2020-06-29 PROCEDURE — 80053 COMPREHEN METABOLIC PANEL: CPT | Performed by: NURSE PRACTITIONER

## 2020-06-29 PROCEDURE — 82306 VITAMIN D 25 HYDROXY: CPT | Performed by: NURSE PRACTITIONER

## 2020-06-29 PROCEDURE — 87491 CHLMYD TRACH DNA AMP PROBE: CPT | Performed by: NURSE PRACTITIONER

## 2020-06-29 PROCEDURE — 99396 PREV VISIT EST AGE 40-64: CPT | Performed by: NURSE PRACTITIONER

## 2020-06-29 PROCEDURE — 86706 HEP B SURFACE ANTIBODY: CPT | Performed by: NURSE PRACTITIONER

## 2020-06-29 PROCEDURE — 86704 HEP B CORE ANTIBODY TOTAL: CPT | Performed by: NURSE PRACTITIONER

## 2020-06-29 RX ORDER — HYDROCODONE BITARTRATE AND ACETAMINOPHEN 5; 325 MG/1; MG/1
1 TABLET ORAL EVERY 6 HOURS PRN
Qty: 12 TABLET | Refills: 0 | Status: SHIPPED | OUTPATIENT
Start: 2020-06-29 | End: 2020-09-02

## 2020-06-29 RX ORDER — ALLOPURINOL 100 MG/1
100 TABLET ORAL 2 TIMES DAILY
Qty: 60 TABLET | Refills: 5 | Status: SHIPPED | OUTPATIENT
Start: 2020-06-29 | End: 2020-10-07

## 2020-06-29 RX ORDER — LEVOTHYROXINE SODIUM 0.07 MG/1
75 TABLET ORAL DAILY
Qty: 30 TABLET | Refills: 5 | Status: SHIPPED | OUTPATIENT
Start: 2020-06-29 | End: 2020-11-19 | Stop reason: SDUPTHER

## 2020-06-29 RX ORDER — BUSPIRONE HYDROCHLORIDE 5 MG/1
5 TABLET ORAL 3 TIMES DAILY
Qty: 90 TABLET | Refills: 5 | Status: SHIPPED | OUTPATIENT
Start: 2020-06-29 | End: 2020-08-31 | Stop reason: SDUPTHER

## 2020-06-29 RX ORDER — SILDENAFIL CITRATE 20 MG/1
20 TABLET ORAL DAILY
Qty: 30 TABLET | Refills: 5 | Status: SHIPPED | OUTPATIENT
Start: 2020-06-29 | End: 2020-11-19 | Stop reason: SDUPTHER

## 2020-06-29 RX ORDER — ESCITALOPRAM OXALATE 10 MG/1
10 TABLET ORAL DAILY
Qty: 30 TABLET | Refills: 5 | Status: SHIPPED | OUTPATIENT
Start: 2020-06-29 | End: 2020-10-01 | Stop reason: SDUPTHER

## 2020-06-29 RX ORDER — FAMCICLOVIR 500 MG/1
500 TABLET ORAL 2 TIMES DAILY
Qty: 60 TABLET | Refills: 5 | Status: SHIPPED | OUTPATIENT
Start: 2020-06-29 | End: 2020-11-19 | Stop reason: SDUPTHER

## 2020-06-30 PROBLEM — E03.9 ACQUIRED HYPOTHYROIDISM: Status: ACTIVE | Noted: 2020-06-30

## 2020-06-30 LAB
25(OH)D3 SERPL-MCNC: 49.8 NG/ML (ref 30–100)
ALBUMIN SERPL-MCNC: 4.9 G/DL (ref 3.5–5.2)
ALBUMIN UR-MCNC: <1.2 MG/DL
ALBUMIN/GLOB SERPL: 1.8 G/DL
ALP SERPL-CCNC: 66 U/L (ref 39–117)
ALT SERPL W P-5'-P-CCNC: 50 U/L (ref 1–41)
ANION GAP SERPL CALCULATED.3IONS-SCNC: 8.3 MMOL/L (ref 5–15)
AST SERPL-CCNC: 43 U/L (ref 1–40)
BASOPHILS # BLD AUTO: 0.08 10*3/MM3 (ref 0–0.2)
BASOPHILS NFR BLD AUTO: 1.5 % (ref 0–1.5)
BILIRUB SERPL-MCNC: 0.5 MG/DL (ref 0.2–1.2)
BUN SERPL-MCNC: 20 MG/DL (ref 6–20)
BUN/CREAT SERPL: 15.4 (ref 7–25)
CALCIUM SPEC-SCNC: 9.5 MG/DL (ref 8.6–10.5)
CHLORIDE SERPL-SCNC: 102 MMOL/L (ref 98–107)
CHOLEST SERPL-MCNC: 203 MG/DL (ref 0–200)
CO2 SERPL-SCNC: 27.7 MMOL/L (ref 22–29)
CREAT SERPL-MCNC: 1.3 MG/DL (ref 0.76–1.27)
DEPRECATED RDW RBC AUTO: 42.3 FL (ref 37–54)
EOSINOPHIL # BLD AUTO: 0.08 10*3/MM3 (ref 0–0.4)
EOSINOPHIL NFR BLD AUTO: 1.5 % (ref 0.3–6.2)
ERYTHROCYTE [DISTWIDTH] IN BLOOD BY AUTOMATED COUNT: 12.8 % (ref 12.3–15.4)
GFR SERPL CREATININE-BSD FRML MDRD: 57 ML/MIN/1.73
GLOBULIN UR ELPH-MCNC: 2.7 GM/DL
GLUCOSE SERPL-MCNC: 96 MG/DL (ref 65–99)
HBA1C MFR BLD: 5.6 % (ref 4.8–5.6)
HBV SURFACE AB SER RIA-ACNC: NORMAL
HBV SURFACE AG SERPL QL IA: NORMAL
HCT VFR BLD AUTO: 48.2 % (ref 37.5–51)
HDLC SERPL-MCNC: 38 MG/DL (ref 40–60)
HGB BLD-MCNC: 16.4 G/DL (ref 13–17.7)
HIV1+2 AB SER QL: NORMAL
HOLD SPECIMEN: NORMAL
IMM GRANULOCYTES # BLD AUTO: 0.02 10*3/MM3 (ref 0–0.05)
IMM GRANULOCYTES NFR BLD AUTO: 0.4 % (ref 0–0.5)
LDLC SERPL CALC-MCNC: 152 MG/DL (ref 0–100)
LDLC/HDLC SERPL: 3.99 {RATIO}
LYMPHOCYTES # BLD AUTO: 1.26 10*3/MM3 (ref 0.7–3.1)
LYMPHOCYTES NFR BLD AUTO: 23 % (ref 19.6–45.3)
MCH RBC QN AUTO: 30.9 PG (ref 26.6–33)
MCHC RBC AUTO-ENTMCNC: 34 G/DL (ref 31.5–35.7)
MCV RBC AUTO: 90.8 FL (ref 79–97)
MONOCYTES # BLD AUTO: 0.35 10*3/MM3 (ref 0.1–0.9)
MONOCYTES NFR BLD AUTO: 6.4 % (ref 5–12)
NEUTROPHILS NFR BLD AUTO: 3.7 10*3/MM3 (ref 1.7–7)
NEUTROPHILS NFR BLD AUTO: 67.2 % (ref 42.7–76)
NRBC BLD AUTO-RTO: 0 /100 WBC (ref 0–0.2)
PLATELET # BLD AUTO: 180 10*3/MM3 (ref 140–450)
PMV BLD AUTO: 11.8 FL (ref 6–12)
POTASSIUM SERPL-SCNC: 4.4 MMOL/L (ref 3.5–5.2)
PROT SERPL-MCNC: 7.6 G/DL (ref 6–8.5)
RBC # BLD AUTO: 5.31 10*6/MM3 (ref 4.14–5.8)
SODIUM SERPL-SCNC: 138 MMOL/L (ref 136–145)
TRIGL SERPL-MCNC: 66 MG/DL (ref 0–150)
TSH SERPL DL<=0.05 MIU/L-ACNC: 1.1 UIU/ML (ref 0.27–4.2)
VIT B12 BLD-MCNC: 1043 PG/ML (ref 211–946)
VLDLC SERPL-MCNC: 13.2 MG/DL (ref 5–40)
WBC # BLD AUTO: 5.49 10*3/MM3 (ref 3.4–10.8)

## 2020-07-01 ENCOUNTER — LAB (OUTPATIENT)
Dept: FAMILY MEDICINE CLINIC | Facility: CLINIC | Age: 55
End: 2020-07-01

## 2020-07-01 DIAGNOSIS — Z83.1 FAMILY HISTORY OF WORMS: ICD-10-CM

## 2020-07-01 LAB
B BURGDOR IGG SER QL: NEGATIVE
B BURGDOR IGM SER QL: NEGATIVE
HAV AB SER QL IA: NEGATIVE
HBV CORE AB SER DONR QL IA: NEGATIVE
RPR SER QL: NORMAL

## 2020-07-01 PROCEDURE — 87177 OVA AND PARASITES SMEARS: CPT | Performed by: NURSE PRACTITIONER

## 2020-07-01 PROCEDURE — 87209 SMEAR COMPLEX STAIN: CPT | Performed by: NURSE PRACTITIONER

## 2020-07-02 LAB
MERCURY BLD-MCNC: 1.4 UG/L (ref 0–14.9)
O+P SPEC MICRO: NORMAL
OVA + PARASITE RESULT 1: NORMAL

## 2020-07-06 ENCOUNTER — TELEPHONE (OUTPATIENT)
Dept: FAMILY MEDICINE CLINIC | Facility: CLINIC | Age: 55
End: 2020-07-06

## 2020-07-06 NOTE — TELEPHONE ENCOUNTER
----- Message from DIPTI Perez sent at 7/2/2020  8:28 AM EDT -----  Please let the patient know he does not have Lyme disease and his mercury level is normal.      Spoke to corine and gave him results

## 2020-07-21 ENCOUNTER — CONSULT (OUTPATIENT)
Dept: GASTROENTEROLOGY | Facility: CLINIC | Age: 55
End: 2020-07-21

## 2020-07-21 ENCOUNTER — HOSPITAL ENCOUNTER (OUTPATIENT)
Dept: GENERAL RADIOLOGY | Facility: HOSPITAL | Age: 55
Discharge: HOME | End: 2020-07-21
Admitting: PHYSICIAN ASSISTANT

## 2020-07-21 VITALS
HEART RATE: 82 BPM | SYSTOLIC BLOOD PRESSURE: 128 MMHG | HEIGHT: 72 IN | BODY MASS INDEX: 26.57 KG/M2 | TEMPERATURE: 98.4 F | WEIGHT: 196.2 LBS | DIASTOLIC BLOOD PRESSURE: 81 MMHG

## 2020-07-21 DIAGNOSIS — R14.0 BLOATING: Primary | ICD-10-CM

## 2020-07-21 DIAGNOSIS — R14.0 BLOATING: ICD-10-CM

## 2020-07-21 DIAGNOSIS — R74.8 ABNORMAL AST AND ALT: ICD-10-CM

## 2020-07-21 DIAGNOSIS — K80.20 CALCULUS OF GALLBLADDER WITHOUT CHOLECYSTITIS WITHOUT OBSTRUCTION: ICD-10-CM

## 2020-07-21 PROCEDURE — 74019 RADEX ABDOMEN 2 VIEWS: CPT

## 2020-07-21 PROCEDURE — 99243 OFF/OP CNSLTJ NEW/EST LOW 30: CPT | Performed by: PHYSICIAN ASSISTANT

## 2020-07-21 PROCEDURE — 74019 RADEX ABDOMEN 2 VIEWS: CPT | Performed by: RADIOLOGY

## 2020-07-21 NOTE — PROGRESS NOTES
": 1965    Chief Complaint   Patient presents with   • Bloated       Barrington Chao is a 54 y.o. male who presents to the office today as a consultation from DIPTI Jiménez for evaluation of Bloated.    History of Present Illness:  Bloating is severe within 30 minutes after eating per his reports, no matter the type of food that he eats. This bloating complaint has been present for approx 5 years. Also seems to have generalized abdominal \"soreness\" for several hours after eating any amount of food. He has tried high protein, low, carb, high carb and low fat diets without relief. He has stools daily without skipping days, points to #4 on the [place] Stool Scale. No rectal bleeding or melena. Stephen heartburn or reflux symptoms. Denies any abdominal pain. Wonders if there is a way to remove a gallstones without having a cholecystectomy. He does not want to have sedation for a procedure. He drinks 4 bottles of water daily. He reports that most recent labs by PCP showed decreased GFR and incr Cr, increased AST and ALT. Denies history of diabetes but has had increased cholesterol, not on medications. Denies any alcohol use or past history of alcoholism.     Review of Systems   Constitutional: Negative.    HENT: Negative for sore throat and trouble swallowing.    Eyes: Negative.    Respiratory: Negative for chest tightness.    Cardiovascular: Negative for chest pain.   Gastrointestinal: Positive for abdominal distention and abdominal pain. Negative for anal bleeding, blood in stool, constipation, diarrhea, nausea, rectal pain and vomiting.   Endocrine: Negative.    Genitourinary: Positive for difficulty urinating. Negative for hematuria.   Musculoskeletal: Positive for back pain. Negative for neck pain.   Skin: Negative.    Allergic/Immunologic: Negative for environmental allergies and food allergies.   Neurological: Negative for dizziness, seizures and headaches.   Hematological: Does not bruise/bleed " "easily.   Psychiatric/Behavioral: Positive for sleep disturbance.     I have reviewed and confirmed the accuracy of the ROS as documented by the MA/LPN/RN Jacquelyn Schaefer PA-C    Past Medical History:   Diagnosis Date   • Anxiety    • Arthritis     recent onset of joint stiffness       History reviewed. No pertinent surgical history.    Family History   Problem Relation Age of Onset   • Arthritis Mother    • Cancer Mother    • Thyroid disease Mother    • Diabetes Brother    • Stroke Paternal Grandmother    • Thyroid disease Paternal Grandmother        Social History     Socioeconomic History   • Marital status:      Spouse name: nguyen   • Number of children: 2   • Years of education: 18   • Highest education level: Not on file   Occupational History   • Occupation: [employer]   Tobacco Use   • Smoking status: Never Smoker   • Smokeless tobacco: Never Used   Substance and Sexual Activity   • Alcohol use: No   • Drug use: No   • Sexual activity: Defer       Current Outpatient Medications:   •  allopurinol (ZYLOPRIM) 100 MG tablet, Take 1 tablet by mouth 2 (Two) Times a Day., Disp: 60 tablet, Rfl: 5  •  busPIRone (BUSPAR) 5 MG tablet, Take 1 tablet by mouth 3 (Three) Times a Day., Disp: 90 tablet, Rfl: 5  •  escitalopram (LEXAPRO) 10 MG tablet, Take 1 tablet by mouth Daily., Disp: 30 tablet, Rfl: 5  •  famciclovir (FAMVIR) 500 MG tablet, Take 1 tablet by mouth 2 (Two) Times a Day., Disp: 60 tablet, Rfl: 5  •  HYDROcodone-acetaminophen (Norco) 5-325 MG per tablet, Take 1 tablet by mouth Every 6 (Six) Hours As Needed for Mild Pain  or Moderate Pain ., Disp: 12 tablet, Rfl: 0  •  levothyroxine (Synthroid) 75 MCG tablet, Take 1 tablet by mouth Daily., Disp: 30 tablet, Rfl: 5  •  sildenafil (REVATIO) 20 MG tablet, Take 1 tablet by mouth Daily., Disp: 30 tablet, Rfl: 05    Allergies:   Patient has no known allergies.    Vitals:  /81   Pulse 82   Temp 98.4 °F (36.9 °C)   Ht 182.9 cm (72\")   Wt 89 kg " (196 lb 3.2 oz)   BMI 26.61 kg/m²     Physical Exam   Constitutional: He is oriented to person, place, and time. He appears well-developed and well-nourished. No distress.   HENT:   Head: Normocephalic and atraumatic.   Wearing a mask   Eyes: Conjunctivae are normal. Right eye exhibits no discharge. Left eye exhibits no discharge. No scleral icterus.   Neck: Normal range of motion. No JVD present.   Cardiovascular: Normal rate, regular rhythm and normal heart sounds. Exam reveals no gallop and no friction rub.   No murmur heard.  Pulmonary/Chest: Effort normal and breath sounds normal. No respiratory distress. He has no wheezes. He has no rales. He exhibits no tenderness.   Abdominal: Soft. Bowel sounds are normal. He exhibits no mass. There is tenderness (generalized, mild).   Musculoskeletal: Normal range of motion. He exhibits no edema or deformity.   Neurological: He is alert and oriented to person, place, and time. Coordination normal.   Skin: Skin is warm and dry. No rash noted. He is not diaphoretic. No erythema.   Psychiatric: His behavior is normal. Judgment and thought content normal.   Mild anxious affect. Great historian.   Vitals reviewed.    Results Review:  Labs 7/2020: CBC normal, CMP shows GFR in 50s, mildly elevated AST, ALT but normal AP and normal Bili  2018 US liver normal, gallstone in gb    Assessment:  1. Bloating    2. Calculus of gallbladder without cholecystitis without obstruction    3. Abnormal AST and ALT      Plan:  I recommended EGD and colonoscopy due to his age and current complaints, he would like to defer for now.     Orders Placed This Encounter   Procedures   • XR Abdomen Flat & Upright     Standing Status:   Future     Standing Expiration Date:   7/21/2021     Order Specific Question:   Reason for Exam:     Answer:   bloating     He will complete abdominal film, more recommendations will be made after this has been reviewed. Differential diagnoses include gastroparesis,  gastritis, ulcers, SIBO, constipation, gallbladder disease, etc. I have asked him to start decreasing portion sizes, avoiding fatty food or carbohydrates.         Return in about 2 weeks (around 8/4/2020) for recheck bloating.      Electronically signed 7/21/2020 14:40  Jacquelyn Schaefer PA-C, [facility]

## 2020-07-22 ENCOUNTER — TELEPHONE (OUTPATIENT)
Dept: GASTROENTEROLOGY | Facility: CLINIC | Age: 55
End: 2020-07-22

## 2020-07-22 DIAGNOSIS — K59.00 CONSTIPATION, UNSPECIFIED CONSTIPATION TYPE: Primary | ICD-10-CM

## 2020-07-22 RX ORDER — POLYETHYLENE GLYCOL 3350 17 G/17G
17 POWDER, FOR SOLUTION ORAL DAILY
Qty: 510 G | Refills: 2 | Status: SHIPPED | OUTPATIENT
Start: 2020-07-22 | End: 2020-08-31

## 2020-07-22 NOTE — TELEPHONE ENCOUNTER
Please let pt know that his abdominal film showed severe constipation. He needs to complete magnesium citrate bowel cleanse and start taking Miralax 1 dose daily until next visit.

## 2020-07-23 ENCOUNTER — OFFICE VISIT (OUTPATIENT)
Dept: UROLOGY | Facility: CLINIC | Age: 55
End: 2020-07-23

## 2020-07-23 DIAGNOSIS — N40.1 BENIGN PROSTATIC HYPERPLASIA WITH POST-VOID DRIBBLING: Primary | ICD-10-CM

## 2020-07-23 DIAGNOSIS — N39.43 BENIGN PROSTATIC HYPERPLASIA WITH POST-VOID DRIBBLING: Primary | ICD-10-CM

## 2020-07-23 PROCEDURE — 84153 ASSAY OF PSA TOTAL: CPT | Performed by: UROLOGY

## 2020-07-23 PROCEDURE — 99204 OFFICE O/P NEW MOD 45 MIN: CPT | Performed by: UROLOGY

## 2020-07-23 RX ORDER — TAMSULOSIN HYDROCHLORIDE 0.4 MG/1
1 CAPSULE ORAL NIGHTLY
Qty: 30 CAPSULE | Refills: 5 | Status: SHIPPED | OUTPATIENT
Start: 2020-07-23 | End: 2020-11-19 | Stop reason: SDUPTHER

## 2020-07-24 DIAGNOSIS — M15.9 OSTEOARTHRITIS OF MULTIPLE JOINTS, UNSPECIFIED OSTEOARTHRITIS TYPE: ICD-10-CM

## 2020-07-24 PROBLEM — N40.1 BENIGN PROSTATIC HYPERPLASIA WITH POST-VOID DRIBBLING: Status: ACTIVE | Noted: 2020-07-24

## 2020-07-24 PROBLEM — N39.43 BENIGN PROSTATIC HYPERPLASIA WITH POST-VOID DRIBBLING: Status: ACTIVE | Noted: 2020-07-24

## 2020-07-24 LAB — PSA SERPL-MCNC: 2.59 NG/ML (ref 0–4)

## 2020-07-24 NOTE — PROGRESS NOTES
Chief Complaint:          BPH    HPI:   54 y.o. male furred for evaluation of urinary frequency.  He voids every 30 minutes during the day when he has a weak stream with significant dribbling he has nocturia 5 times at night.  His PSA was measured at 2.87.  At the finish of his stream he dribbles.  He has no surgeries and he denies any medical problems but review of his med shows Lexapro, BuSpar, chronic herpes medication and sildenafil.  He normal examination with a small prostate I discussed the pathophysiology of BPH extensively, start him on Flomax and see him back in 2 months.  I am going to repeat a PSA today as it is been quite a while since the prior one.  His postvoid residual is 28 cc he could not give a urine specimen.      Past Medical History:        Past Medical History:   Diagnosis Date   • Anxiety    • Arthritis     recent onset of joint stiffness         Current Meds:     Current Outpatient Medications   Medication Sig Dispense Refill   • allopurinol (ZYLOPRIM) 100 MG tablet Take 1 tablet by mouth 2 (Two) Times a Day. 60 tablet 5   • busPIRone (BUSPAR) 5 MG tablet Take 1 tablet by mouth 3 (Three) Times a Day. 90 tablet 5   • escitalopram (LEXAPRO) 10 MG tablet Take 1 tablet by mouth Daily. 30 tablet 5   • famciclovir (FAMVIR) 500 MG tablet Take 1 tablet by mouth 2 (Two) Times a Day. 60 tablet 5   • HYDROcodone-acetaminophen (Norco) 5-325 MG per tablet Take 1 tablet by mouth Every 6 (Six) Hours As Needed for Mild Pain  or Moderate Pain . 12 tablet 0   • levothyroxine (Synthroid) 75 MCG tablet Take 1 tablet by mouth Daily. 30 tablet 5   • magnesium citrate solution Take 296 mL by mouth Take As Directed. Take 1 bottle now than 2nd bottle approx 6 hours after for clean out. 592 mL 0   • polyethylene glycol (MIRALAX) 17 GM/SCOOP powder Take 17 g by mouth Daily. 510 g 2   • sildenafil (REVATIO) 20 MG tablet Take 1 tablet by mouth Daily. 30 tablet 05   • tamsulosin (Flomax) 0.4 MG capsule 24 hr capsule  Take 1 capsule by mouth Every Night. 30 capsule 5     No current facility-administered medications for this visit.         Allergies:      No Known Allergies     Past Surgical History:     No past surgical history on file.      Social History:     Social History     Socioeconomic History   • Marital status:      Spouse name: nguyen   • Number of children: 2   • Years of education: 18   • Highest education level: Not on file   Occupational History   • Occupation: [employer]   Tobacco Use   • Smoking status: Never Smoker   • Smokeless tobacco: Never Used   Substance and Sexual Activity   • Alcohol use: No   • Drug use: No   • Sexual activity: Defer       Family History:     Family History   Problem Relation Age of Onset   • Arthritis Mother    • Cancer Mother    • Thyroid disease Mother    • Diabetes Brother    • Stroke Paternal Grandmother    • Thyroid disease Paternal Grandmother        Review of Systems:     Review of Systems   Constitutional: Negative.    HENT: Negative.    Eyes: Negative.    Respiratory: Negative.    Cardiovascular: Negative.    Gastrointestinal: Negative.    Endocrine: Negative.    Genitourinary: Positive for difficulty urinating, frequency and urgency.   Musculoskeletal: Negative.    Allergic/Immunologic: Negative.    Neurological: Negative.    Hematological: Negative.    Psychiatric/Behavioral: Negative.        Physical Exam:     Physical Exam   Constitutional: He is oriented to person, place, and time. He appears well-developed and well-nourished.   HENT:   Head: Normocephalic and atraumatic.   Eyes: Pupils are equal, round, and reactive to light. Conjunctivae and EOM are normal.   Neck: Normal range of motion.   Cardiovascular: Normal rate, regular rhythm, normal heart sounds and intact distal pulses.   Pulmonary/Chest: Effort normal and breath sounds normal.   Abdominal: Soft. Bowel sounds are normal.   Genitourinary:   Genitourinary Comments: Soft nontender abdomen with no  organomegaly, rigidity, or tenderness.  He has normal external genitalia and circumcised phallus  bilaterally descended testes without masses there is no inguinal hernias adenopathy or abnormalities he had good rectal tone and a small smooth firm prostate.  There is no nodularity or any suspicious rectal abnormalities     Musculoskeletal: Normal range of motion.   Neurological: He is alert and oriented to person, place, and time. He has normal reflexes.   Skin: Skin is warm and dry.   Psychiatric: He has a normal mood and affect. His behavior is normal. Judgment and thought content normal.   Nursing note and vitals reviewed.      I have reviewed the following portions of the patient's history: allergies, current medications, past family history, past medical history, past social history, past surgical history, problem list and ROS and confirm it's accurate.      Procedure:       Assessment/Plan:   BPH: Discussed the pathophysiology of BPH and obstruction.  We discussed the static and dynamic effect effects of BPH as well as using 5 alpha reductase inhibitors versus alpha blockade.  We discussed the indications for transurethral surgery as well.  And/ or other therapeutic options available including all of the newer techniques.  Start Flomax  Erectile dysfunction-we discussed the anatomy and physiology of the penis and the endothelium.  We discussed the various forms of erectile dysfunction including peripheral vascular occlusive disease, postoperative, secondary to radiation treatments of the prostate, and arterial inflow.  We discussed the various treatment options available including oral medication and its various forms.  We discussed the use of both generic and non-generic Viagra.  We discussed Cialis and a longer half-life of 17 hours as well as the other 2 medications.  We discussed cost involved with this including the fact that the generic is much cheaper but is taken has multiple pills because they are 20  mg dosages.  We did discuss the other alternatives including Penile injections, vacuum erection devices and surgical intervention reserved for only the most severe cases.  We discussed the need for testosterone in about 20% of cases of erectile dysfunction.  Continue sildenafil  PSA testing-I am recommending a PSA blood test that stands for prostate specific antigen.  I discussed the pathophysiology of PSA testing indicating its use in the diagnosis and management of prostate cancer.  I discussed the normal range being 0-4 but more appropriately being much closer to 0-2 in a normal male.  I discussed the fact that after certain age we don't recommend PSA testing especially in view of numerous comorbidities.  That this will not be a useful test.  I discussed many of the things that can artificially raised PSA including a recent infection, urinary tract infection, recent sexual intercourse.  Where even the type of movement such as manipulation of the prostate  riding a bicycle.  After all this is taken into account when the test is reviewed  the most important use of PSA which is the velocity measurement in other words the change of PSA with time as a very important factor in the use and that we look for greater than 20% rise over a year to help us make the prediction of prostate cancer.  I also discussed that the use with prostate cancer indicating that after a radical prostatectomy the PSA should be 0 and any rise indicates an early biochemical recurrence            Patient's There is no height or weight on file to calculate BMI. BMI is above normal parameters. Recommendations include: educational material.              This document has been electronically signed by TRISTIN MACKEY MD July 24, 2020 08:29

## 2020-07-28 ENCOUNTER — TELEPHONE (OUTPATIENT)
Dept: GASTROENTEROLOGY | Facility: CLINIC | Age: 55
End: 2020-07-28

## 2020-07-28 DIAGNOSIS — Z12.11 ENCOUNTER FOR COLORECTAL CANCER SCREENING: ICD-10-CM

## 2020-07-28 DIAGNOSIS — R14.0 BLOATING: Primary | ICD-10-CM

## 2020-07-28 DIAGNOSIS — Z12.12 ENCOUNTER FOR COLORECTAL CANCER SCREENING: ICD-10-CM

## 2020-07-28 RX ORDER — HYDROCODONE BITARTRATE AND ACETAMINOPHEN 5; 325 MG/1; MG/1
TABLET ORAL
Qty: 12 TABLET | Refills: 0 | OUTPATIENT
Start: 2020-07-28

## 2020-08-04 ENCOUNTER — PREP FOR SURGERY (OUTPATIENT)
Dept: OTHER | Facility: HOSPITAL | Age: 55
End: 2020-08-04

## 2020-08-04 ENCOUNTER — OFFICE VISIT (OUTPATIENT)
Dept: GASTROENTEROLOGY | Facility: CLINIC | Age: 55
End: 2020-08-04

## 2020-08-04 VITALS
TEMPERATURE: 97.7 F | WEIGHT: 196.6 LBS | HEART RATE: 59 BPM | BODY MASS INDEX: 26.06 KG/M2 | HEIGHT: 73 IN | DIASTOLIC BLOOD PRESSURE: 82 MMHG | OXYGEN SATURATION: 98 % | SYSTOLIC BLOOD PRESSURE: 137 MMHG

## 2020-08-04 DIAGNOSIS — R10.84 GENERALIZED ABDOMINAL PAIN: Primary | ICD-10-CM

## 2020-08-04 DIAGNOSIS — K59.00 CONSTIPATION, UNSPECIFIED CONSTIPATION TYPE: ICD-10-CM

## 2020-08-04 PROCEDURE — 99213 OFFICE O/P EST LOW 20 MIN: CPT | Performed by: PHYSICIAN ASSISTANT

## 2020-08-04 NOTE — PATIENT INSTRUCTIONS
Fiber Foods  It is recommended that you consume 25-45 grams daily.    Fresh & Dried Fruit  Serving Size Fiber (g)    Apples with skin  1 medium 5.0    Apricot  3 medium 1.0    Apricots, dried  4 pieces 2.9    Banana  1 medium 3.9    Blueberries  1 cup 4.2    Cantaloupe, cubes  1 cup 1.3    Figs, dried  2 medium 3.7    Grapefruit  1/2 medium 3.1    Orange, navel  1 medium 3.4    Peach  1 medium 2.0    Peaches, dried  3 pieces 3.2    Pear  1 medium 5.1    Plum  1 medium 1.1    Raisins  1.5 oz box 1.6    Raspberries  1 cup 6.4    Strawberries  1 cup 4.4      Grains, Beans, Nuts & Seeds  Serving Size Fiber (g)    Almonds  1 oz 4.2    Black beans, cooked  1 cup 13.9    Bran cereal  1 cup 19.9    Bread, whole wheat  1 slice 2.0    Brown rice, dry  1 cup 7.9    Cashews  1 oz 1.0    Flax seeds  3 Tbsp. 6.9    Garbanzo beans, cooked  1 cup 5.8    Kidney beans, cooked  1 cup 11.6    Lentils, red cooked  1 cup 13.6    Lima beans, cooked  1 cup 8.6    Oats, rolled dry  1 cup 12.0    Quinoa (seeds) dry  1/4 cup 6.2    Quinoa, cooked  1 cup 8.4    Pasta, whole wheat  1 cup 6.3    Peanuts  1 oz 2.3    Pistachio nuts  1 oz 3.1    Pumpkin seeds  1/4 cup 4.1    Soybeans, cooked  1 cup 8.6    Sunflower seeds  1/4 cup 3.0    Walnuts  1 oz 3.1            Vegetables  Serving Size Fiber (g)    Avocado (fruit)  1 medium 11.8    Beets, cooked  1 cup 2.8    Beet greens  1 cup 4.2    Bok irene, cooked  1 cup 2.8    Broccoli, cooked  1 cup 4.5    [place] sprouts, cooked  1 cup 3.6    Cabbage, cooked  1 cup 4.2    Carrot  1 medium 2.6    Carrot, cooked  1 cup 5.2    Cauliflower, cooked  1 cup 3.4    Skyler slaw  1 cup 4.0    Maddie greens, cooked  1 cup 2.6    Corn, sweet  1 cup 4.6    Green beans  1 cup 4.0    Celery  1 stalk 1.1    Kale, cooked  1 cup 7.2    Onions, raw  1 cup 2.9    Peas, cooked  1 cup 8.8    Peppers, sweet  1 cup 2.6    Pop corn, air-popped  3 cups 3.6    Potato, baked w/ skin  1 medium 4.8    Spinach, cooked  1 cup 4.3     Summer squash, cooked  1 cup 2.5    Sweet potato, cooked  1 medium 4.9    Swiss chard, cooked  1 cup 3.7    Tomato  1 medium 1.0    Winter squash, cooked  1 cup 6.2    Zucchini, cooked  1 cup 2.6

## 2020-08-04 NOTE — H&P (VIEW-ONLY)
": 1965    Chief Complaint   Patient presents with   • Abdominal Pain   • Bloated       Barrington Chao is a 54 y.o. male who presents to the office today as a follow up appointment regarding Abdominal Pain and Bloated.    History of Present Illness:  Since he had magnesium citrate bowel cleanse for treatment of severe constipation found on abdominal film, he has noticed some improvement in bloating. He has been taking MIralax 17 g once daily since then, he has not seen any change in bowel movements from his normal since starting this. He has procedures scheduled and plans to keep those for further evaluation.    Previous history:  Bloating is severe within 30 minutes after eating per his reports, no matter the type of food that he eats. This bloating complaint has been present for approx 5 years. Also seems to have generalized abdominal \"soreness\" for several hours after eating any amount of food. He has tried high protein, low, carb, high carb and low fat diets without relief. He has stools daily without skipping days, points to #4 on the [place] Stool Scale. No rectal bleeding or melena. Stephen heartburn or reflux symptoms. Denies any abdominal pain. Wonders if there is a way to remove a gallstones without having a cholecystectomy. He does not want to have sedation for a procedure. He drinks 4 bottles of water daily. He reports that most recent labs by PCP showed decreased GFR and incr Cr, increased AST and ALT. Denies history of diabetes but has had increased cholesterol, not on medications. Denies any alcohol use or past history of alcoholism.     Review of Systems   Constitutional: Negative.    HENT: Negative for sore throat and trouble swallowing.    Eyes: Negative.    Respiratory: Negative for chest tightness.    Cardiovascular: Negative for chest pain.   Gastrointestinal: Positive for abdominal distention and abdominal pain. Negative for anal bleeding, blood in stool, constipation, diarrhea, nausea, " rectal pain and vomiting.   Endocrine: Negative.    Genitourinary: Positive for difficulty urinating. Negative for hematuria.   Musculoskeletal: Positive for back pain. Negative for neck pain.   Skin: Negative.    Allergic/Immunologic: Negative for environmental allergies and food allergies.   Neurological: Negative for dizziness, seizures and headaches.   Hematological: Does not bruise/bleed easily.   Psychiatric/Behavioral: Positive for sleep disturbance.     I have reviewed and confirmed the accuracy of the ROS as documented by the MA/LPN/RN Jacquelyn Schaefer PA-C    Past Medical History:   Diagnosis Date   • Anxiety    • Arthritis     recent onset of joint stiffness       History reviewed. No pertinent surgical history.    Family History   Problem Relation Age of Onset   • Arthritis Mother    • Cancer Mother    • Thyroid disease Mother    • Diabetes Brother    • Stroke Paternal Grandmother    • Thyroid disease Paternal Grandmother        Social History     Socioeconomic History   • Marital status:      Spouse name: nguyen   • Number of children: 2   • Years of education: 18   • Highest education level: Not on file   Occupational History   • Occupation: [employer]   Tobacco Use   • Smoking status: Never Smoker   • Smokeless tobacco: Never Used   Substance and Sexual Activity   • Alcohol use: No   • Drug use: No   • Sexual activity: Defer       Current Outpatient Medications:   •  allopurinol (ZYLOPRIM) 100 MG tablet, Take 1 tablet by mouth 2 (Two) Times a Day., Disp: 60 tablet, Rfl: 5  •  busPIRone (BUSPAR) 5 MG tablet, Take 1 tablet by mouth 3 (Three) Times a Day., Disp: 90 tablet, Rfl: 5  •  escitalopram (LEXAPRO) 10 MG tablet, Take 1 tablet by mouth Daily., Disp: 30 tablet, Rfl: 5  •  famciclovir (FAMVIR) 500 MG tablet, Take 1 tablet by mouth 2 (Two) Times a Day., Disp: 60 tablet, Rfl: 5  •  HYDROcodone-acetaminophen (Norco) 5-325 MG per tablet, Take 1 tablet by mouth Every 6 (Six) Hours As Needed  "for Mild Pain  or Moderate Pain ., Disp: 12 tablet, Rfl: 0  •  levothyroxine (Synthroid) 75 MCG tablet, Take 1 tablet by mouth Daily., Disp: 30 tablet, Rfl: 5  •  magnesium citrate solution, Take 296 mL by mouth Take As Directed. Take 1 bottle now than 2nd bottle approx 6 hours after for clean out., Disp: 592 mL, Rfl: 0  •  polyethylene glycol (MIRALAX) 17 GM/SCOOP powder, Take 17 g by mouth Daily., Disp: 510 g, Rfl: 2  •  sildenafil (REVATIO) 20 MG tablet, Take 1 tablet by mouth Daily., Disp: 30 tablet, Rfl: 05  •  tamsulosin (Flomax) 0.4 MG capsule 24 hr capsule, Take 1 capsule by mouth Every Night., Disp: 30 capsule, Rfl: 5    Allergies:   Patient has no known allergies.    Vitals:  /82   Pulse 59   Temp 97.7 °F (36.5 °C)   Ht 185.4 cm (73\")   Wt 89.2 kg (196 lb 9.6 oz)   SpO2 98%   BMI 25.94 kg/m²     Physical Exam   Constitutional: He is oriented to person, place, and time. He appears well-developed and well-nourished. No distress.   HENT:   Head: Normocephalic and atraumatic.   Wearing a mask   Eyes: Conjunctivae are normal. Right eye exhibits no discharge. Left eye exhibits no discharge. No scleral icterus.   Neck: Normal range of motion. No JVD present.   Cardiovascular: Normal rate, regular rhythm and normal heart sounds. Exam reveals no gallop and no friction rub.   No murmur heard.  Pulmonary/Chest: Effort normal and breath sounds normal. No respiratory distress. He has no wheezes. He has no rales. He exhibits no tenderness.   Abdominal: Soft. Bowel sounds are normal. He exhibits no mass. There is tenderness (generalized, mild).   Musculoskeletal: Normal range of motion. He exhibits no edema or deformity.   Neurological: He is alert and oriented to person, place, and time. Coordination normal.   Skin: Skin is warm and dry. No rash noted. He is not diaphoretic. No erythema.   Psychiatric: His behavior is normal. Judgment and thought content normal.   Mild anxious affect. Great historian.   "   Vitals reviewed.    Results Review:  Abd film 7/21/2020 constipation    Assessment:  1. Generalized abdominal pain    2. Constipation, unspecified constipation type      Plan:  Increase Miralax 17 g to 2 doses daily for better control of constipation. Monitor stools and abdominal pain closely after this increase in therapy. Keep appointment for endoscopic procedures for further evaluation. He was instructed to increase dietary fiber intake to 25-45g daily and a list of fiber foods was given. He has agreed to try to increase daily water intake and daily exercise as well.       Return for follow up after procedure to discuss results.      Electronically signed 8/20/2020 16:03  Jacquelyn Schaefer PA-C, [facility]

## 2020-08-04 NOTE — PROGRESS NOTES
": 1965    Chief Complaint   Patient presents with   • Abdominal Pain   • Bloated       Barrington Chao is a 54 y.o. male who presents to the office today as a follow up appointment regarding Abdominal Pain and Bloated.    History of Present Illness:  Since he had magnesium citrate bowel cleanse for treatment of severe constipation found on abdominal film, he has noticed some improvement in bloating. He has been taking MIralax 17 g once daily since then, he has not seen any change in bowel movements from his normal since starting this. He has procedures scheduled and plans to keep those for further evaluation.    Previous history:  Bloating is severe within 30 minutes after eating per his reports, no matter the type of food that he eats. This bloating complaint has been present for approx 5 years. Also seems to have generalized abdominal \"soreness\" for several hours after eating any amount of food. He has tried high protein, low, carb, high carb and low fat diets without relief. He has stools daily without skipping days, points to #4 on the [place] Stool Scale. No rectal bleeding or melena. Stephen heartburn or reflux symptoms. Denies any abdominal pain. Wonders if there is a way to remove a gallstones without having a cholecystectomy. He does not want to have sedation for a procedure. He drinks 4 bottles of water daily. He reports that most recent labs by PCP showed decreased GFR and incr Cr, increased AST and ALT. Denies history of diabetes but has had increased cholesterol, not on medications. Denies any alcohol use or past history of alcoholism.     Review of Systems   Constitutional: Negative.    HENT: Negative for sore throat and trouble swallowing.    Eyes: Negative.    Respiratory: Negative for chest tightness.    Cardiovascular: Negative for chest pain.   Gastrointestinal: Positive for abdominal distention and abdominal pain. Negative for anal bleeding, blood in stool, constipation, diarrhea, nausea, " rectal pain and vomiting.   Endocrine: Negative.    Genitourinary: Positive for difficulty urinating. Negative for hematuria.   Musculoskeletal: Positive for back pain. Negative for neck pain.   Skin: Negative.    Allergic/Immunologic: Negative for environmental allergies and food allergies.   Neurological: Negative for dizziness, seizures and headaches.   Hematological: Does not bruise/bleed easily.   Psychiatric/Behavioral: Positive for sleep disturbance.     I have reviewed and confirmed the accuracy of the ROS as documented by the MA/LPN/RN Jacquelyn Schaefer PA-C    Past Medical History:   Diagnosis Date   • Anxiety    • Arthritis     recent onset of joint stiffness       History reviewed. No pertinent surgical history.    Family History   Problem Relation Age of Onset   • Arthritis Mother    • Cancer Mother    • Thyroid disease Mother    • Diabetes Brother    • Stroke Paternal Grandmother    • Thyroid disease Paternal Grandmother        Social History     Socioeconomic History   • Marital status:      Spouse name: nguyen   • Number of children: 2   • Years of education: 18   • Highest education level: Not on file   Occupational History   • Occupation: [employer]   Tobacco Use   • Smoking status: Never Smoker   • Smokeless tobacco: Never Used   Substance and Sexual Activity   • Alcohol use: No   • Drug use: No   • Sexual activity: Defer       Current Outpatient Medications:   •  allopurinol (ZYLOPRIM) 100 MG tablet, Take 1 tablet by mouth 2 (Two) Times a Day., Disp: 60 tablet, Rfl: 5  •  busPIRone (BUSPAR) 5 MG tablet, Take 1 tablet by mouth 3 (Three) Times a Day., Disp: 90 tablet, Rfl: 5  •  escitalopram (LEXAPRO) 10 MG tablet, Take 1 tablet by mouth Daily., Disp: 30 tablet, Rfl: 5  •  famciclovir (FAMVIR) 500 MG tablet, Take 1 tablet by mouth 2 (Two) Times a Day., Disp: 60 tablet, Rfl: 5  •  HYDROcodone-acetaminophen (Norco) 5-325 MG per tablet, Take 1 tablet by mouth Every 6 (Six) Hours As Needed  "for Mild Pain  or Moderate Pain ., Disp: 12 tablet, Rfl: 0  •  levothyroxine (Synthroid) 75 MCG tablet, Take 1 tablet by mouth Daily., Disp: 30 tablet, Rfl: 5  •  magnesium citrate solution, Take 296 mL by mouth Take As Directed. Take 1 bottle now than 2nd bottle approx 6 hours after for clean out., Disp: 592 mL, Rfl: 0  •  polyethylene glycol (MIRALAX) 17 GM/SCOOP powder, Take 17 g by mouth Daily., Disp: 510 g, Rfl: 2  •  sildenafil (REVATIO) 20 MG tablet, Take 1 tablet by mouth Daily., Disp: 30 tablet, Rfl: 05  •  tamsulosin (Flomax) 0.4 MG capsule 24 hr capsule, Take 1 capsule by mouth Every Night., Disp: 30 capsule, Rfl: 5    Allergies:   Patient has no known allergies.    Vitals:  /82   Pulse 59   Temp 97.7 °F (36.5 °C)   Ht 185.4 cm (73\")   Wt 89.2 kg (196 lb 9.6 oz)   SpO2 98%   BMI 25.94 kg/m²     Physical Exam   Constitutional: He is oriented to person, place, and time. He appears well-developed and well-nourished. No distress.   HENT:   Head: Normocephalic and atraumatic.   Wearing a mask   Eyes: Conjunctivae are normal. Right eye exhibits no discharge. Left eye exhibits no discharge. No scleral icterus.   Neck: Normal range of motion. No JVD present.   Cardiovascular: Normal rate, regular rhythm and normal heart sounds. Exam reveals no gallop and no friction rub.   No murmur heard.  Pulmonary/Chest: Effort normal and breath sounds normal. No respiratory distress. He has no wheezes. He has no rales. He exhibits no tenderness.   Abdominal: Soft. Bowel sounds are normal. He exhibits no mass. There is tenderness (generalized, mild).   Musculoskeletal: Normal range of motion. He exhibits no edema or deformity.   Neurological: He is alert and oriented to person, place, and time. Coordination normal.   Skin: Skin is warm and dry. No rash noted. He is not diaphoretic. No erythema.   Psychiatric: His behavior is normal. Judgment and thought content normal.   Mild anxious affect. Great historian. "   Vitals reviewed.    Results Review:  Abd film 7/21/2020 constipation    Assessment:  1. Generalized abdominal pain    2. Constipation, unspecified constipation type      Plan:  Increase Miralax 17 g to 2 doses daily for better control of constipation. Monitor stools and abdominal pain closely after this increase in therapy. Keep appointment for endoscopic procedures for further evaluation. He was instructed to increase dietary fiber intake to 25-45g daily and a list of fiber foods was given. He has agreed to try to increase daily water intake and daily exercise as well.       Return for follow up after procedure to discuss results.      Electronically signed 8/20/2020 16:03  Jacquelyn Schaefer PA-C, [facility]

## 2020-08-06 DIAGNOSIS — R74.8 ABNORMAL AST AND ALT: ICD-10-CM

## 2020-08-06 DIAGNOSIS — Z01.818 PREOP TESTING: Primary | ICD-10-CM

## 2020-08-06 DIAGNOSIS — K80.20 CALCULUS OF GALLBLADDER WITHOUT CHOLECYSTITIS WITHOUT OBSTRUCTION: ICD-10-CM

## 2020-08-06 DIAGNOSIS — R14.0 BLOATING: ICD-10-CM

## 2020-08-06 DIAGNOSIS — Z12.12 ENCOUNTER FOR COLORECTAL CANCER SCREENING: ICD-10-CM

## 2020-08-06 DIAGNOSIS — Z12.11 ENCOUNTER FOR COLORECTAL CANCER SCREENING: ICD-10-CM

## 2020-08-06 DIAGNOSIS — K59.00 CONSTIPATION, UNSPECIFIED CONSTIPATION TYPE: ICD-10-CM

## 2020-08-17 PROBLEM — Z12.12 ENCOUNTER FOR COLORECTAL CANCER SCREENING: Status: ACTIVE | Noted: 2020-08-17

## 2020-08-17 PROBLEM — Z12.11 ENCOUNTER FOR COLORECTAL CANCER SCREENING: Status: ACTIVE | Noted: 2020-08-17

## 2020-08-17 PROBLEM — R14.0 BLOATING: Status: ACTIVE | Noted: 2020-08-17

## 2020-08-21 ENCOUNTER — LAB (OUTPATIENT)
Dept: LAB | Facility: HOSPITAL | Age: 55
End: 2020-08-21

## 2020-08-21 DIAGNOSIS — K80.20 CALCULUS OF GALLBLADDER WITHOUT CHOLECYSTITIS WITHOUT OBSTRUCTION: ICD-10-CM

## 2020-08-21 DIAGNOSIS — R14.0 BLOATING: ICD-10-CM

## 2020-08-21 DIAGNOSIS — K59.00 CONSTIPATION, UNSPECIFIED CONSTIPATION TYPE: ICD-10-CM

## 2020-08-21 DIAGNOSIS — Z12.11 ENCOUNTER FOR COLORECTAL CANCER SCREENING: ICD-10-CM

## 2020-08-21 DIAGNOSIS — R74.8 ABNORMAL AST AND ALT: ICD-10-CM

## 2020-08-21 DIAGNOSIS — Z01.818 PREOP TESTING: ICD-10-CM

## 2020-08-21 DIAGNOSIS — Z12.12 ENCOUNTER FOR COLORECTAL CANCER SCREENING: ICD-10-CM

## 2020-08-21 PROCEDURE — U0002 COVID-19 LAB TEST NON-CDC: HCPCS

## 2020-08-21 PROCEDURE — U0004 COV-19 TEST NON-CDC HGH THRU: HCPCS

## 2020-08-22 LAB
REF LAB TEST METHOD: NORMAL
SARS-COV-2 RNA RESP QL NAA+PROBE: NOT DETECTED

## 2020-08-24 ENCOUNTER — ANESTHESIA (OUTPATIENT)
Dept: PERIOP | Facility: HOSPITAL | Age: 55
End: 2020-08-24

## 2020-08-24 ENCOUNTER — ANESTHESIA EVENT (OUTPATIENT)
Dept: PERIOP | Facility: HOSPITAL | Age: 55
End: 2020-08-24

## 2020-08-24 ENCOUNTER — HOSPITAL ENCOUNTER (OUTPATIENT)
Facility: HOSPITAL | Age: 55
Setting detail: HOSPITAL OUTPATIENT SURGERY
Discharge: HOME | End: 2020-08-24
Attending: INTERNAL MEDICINE | Admitting: INTERNAL MEDICINE

## 2020-08-24 VITALS
HEART RATE: 79 BPM | TEMPERATURE: 97.8 F | SYSTOLIC BLOOD PRESSURE: 110 MMHG | BODY MASS INDEX: 24.39 KG/M2 | DIASTOLIC BLOOD PRESSURE: 68 MMHG | WEIGHT: 184 LBS | RESPIRATION RATE: 20 BRPM | OXYGEN SATURATION: 96 % | HEIGHT: 73 IN

## 2020-08-24 DIAGNOSIS — R14.0 BLOATING: ICD-10-CM

## 2020-08-24 DIAGNOSIS — Z12.11 ENCOUNTER FOR COLORECTAL CANCER SCREENING: ICD-10-CM

## 2020-08-24 DIAGNOSIS — Z12.12 ENCOUNTER FOR COLORECTAL CANCER SCREENING: ICD-10-CM

## 2020-08-24 PROCEDURE — 43239 EGD BIOPSY SINGLE/MULTIPLE: CPT | Performed by: INTERNAL MEDICINE

## 2020-08-24 PROCEDURE — 45378 DIAGNOSTIC COLONOSCOPY: CPT | Performed by: INTERNAL MEDICINE

## 2020-08-24 PROCEDURE — 25010000002 MIDAZOLAM PER 1MG: Performed by: ANESTHESIOLOGY

## 2020-08-24 PROCEDURE — 25010000002 PROPOFOL 10 MG/ML EMULSION: Performed by: NURSE ANESTHETIST, CERTIFIED REGISTERED

## 2020-08-24 PROCEDURE — 25010000002 FENTANYL CITRATE (PF) 100 MCG/2ML SOLUTION: Performed by: NURSE ANESTHETIST, CERTIFIED REGISTERED

## 2020-08-24 RX ORDER — OMEPRAZOLE 40 MG/1
40 CAPSULE, DELAYED RELEASE ORAL
Qty: 60 CAPSULE | Refills: 11 | Status: SHIPPED | OUTPATIENT
Start: 2020-08-24 | End: 2020-08-31 | Stop reason: SDUPTHER

## 2020-08-24 RX ORDER — ONDANSETRON 2 MG/ML
4 INJECTION INTRAMUSCULAR; INTRAVENOUS AS NEEDED
Status: DISCONTINUED | OUTPATIENT
Start: 2020-08-24 | End: 2020-08-24 | Stop reason: HOSPADM

## 2020-08-24 RX ORDER — SODIUM CHLORIDE, SODIUM LACTATE, POTASSIUM CHLORIDE, CALCIUM CHLORIDE 600; 310; 30; 20 MG/100ML; MG/100ML; MG/100ML; MG/100ML
125 INJECTION, SOLUTION INTRAVENOUS CONTINUOUS
Status: DISCONTINUED | OUTPATIENT
Start: 2020-08-24 | End: 2020-08-24 | Stop reason: HOSPADM

## 2020-08-24 RX ORDER — MEPERIDINE HYDROCHLORIDE 25 MG/ML
12.5 INJECTION INTRAMUSCULAR; INTRAVENOUS; SUBCUTANEOUS
Status: DISCONTINUED | OUTPATIENT
Start: 2020-08-24 | End: 2020-08-24 | Stop reason: HOSPADM

## 2020-08-24 RX ORDER — SODIUM CHLORIDE 0.9 % (FLUSH) 0.9 %
10 SYRINGE (ML) INJECTION EVERY 12 HOURS SCHEDULED
Status: DISCONTINUED | OUTPATIENT
Start: 2020-08-24 | End: 2020-08-24 | Stop reason: HOSPADM

## 2020-08-24 RX ORDER — FENTANYL CITRATE 50 UG/ML
50 INJECTION, SOLUTION INTRAMUSCULAR; INTRAVENOUS
Status: DISCONTINUED | OUTPATIENT
Start: 2020-08-24 | End: 2020-08-24 | Stop reason: HOSPADM

## 2020-08-24 RX ORDER — LIDOCAINE HYDROCHLORIDE 20 MG/ML
INJECTION, SOLUTION INFILTRATION; PERINEURAL AS NEEDED
Status: DISCONTINUED | OUTPATIENT
Start: 2020-08-24 | End: 2020-08-24 | Stop reason: SURG

## 2020-08-24 RX ORDER — FENTANYL CITRATE 50 UG/ML
INJECTION, SOLUTION INTRAMUSCULAR; INTRAVENOUS AS NEEDED
Status: DISCONTINUED | OUTPATIENT
Start: 2020-08-24 | End: 2020-08-24 | Stop reason: SURG

## 2020-08-24 RX ORDER — MIDAZOLAM HYDROCHLORIDE 1 MG/ML
1 INJECTION INTRAMUSCULAR; INTRAVENOUS
Status: COMPLETED | OUTPATIENT
Start: 2020-08-24 | End: 2020-08-24

## 2020-08-24 RX ORDER — OXYCODONE HYDROCHLORIDE AND ACETAMINOPHEN 5; 325 MG/1; MG/1
1 TABLET ORAL ONCE AS NEEDED
Status: DISCONTINUED | OUTPATIENT
Start: 2020-08-24 | End: 2020-08-24 | Stop reason: HOSPADM

## 2020-08-24 RX ORDER — IPRATROPIUM BROMIDE AND ALBUTEROL SULFATE 2.5; .5 MG/3ML; MG/3ML
3 SOLUTION RESPIRATORY (INHALATION) ONCE AS NEEDED
Status: DISCONTINUED | OUTPATIENT
Start: 2020-08-24 | End: 2020-08-24 | Stop reason: HOSPADM

## 2020-08-24 RX ORDER — SODIUM CHLORIDE 0.9 % (FLUSH) 0.9 %
10 SYRINGE (ML) INJECTION AS NEEDED
Status: DISCONTINUED | OUTPATIENT
Start: 2020-08-24 | End: 2020-08-24 | Stop reason: HOSPADM

## 2020-08-24 RX ADMIN — EPHEDRINE SULFATE 10 MG: 50 INJECTION, SOLUTION INTRAVENOUS at 16:27

## 2020-08-24 RX ADMIN — SODIUM CHLORIDE, POTASSIUM CHLORIDE, SODIUM LACTATE AND CALCIUM CHLORIDE 125 ML/HR: 600; 310; 30; 20 INJECTION, SOLUTION INTRAVENOUS at 15:21

## 2020-08-24 RX ADMIN — FENTANYL CITRATE 100 MCG: 50 INJECTION INTRAMUSCULAR; INTRAVENOUS at 16:12

## 2020-08-24 RX ADMIN — PROPOFOL 180 MCG/KG/MIN: 10 INJECTION, EMULSION INTRAVENOUS at 16:15

## 2020-08-24 RX ADMIN — LIDOCAINE HYDROCHLORIDE 60 MG: 20 INJECTION, SOLUTION INFILTRATION; PERINEURAL at 16:12

## 2020-08-24 RX ADMIN — MIDAZOLAM HYDROCHLORIDE 1 MG: 1 INJECTION, SOLUTION INTRAMUSCULAR; INTRAVENOUS at 15:23

## 2020-08-24 RX ADMIN — MIDAZOLAM HYDROCHLORIDE 1 MG: 1 INJECTION, SOLUTION INTRAMUSCULAR; INTRAVENOUS at 15:39

## 2020-08-24 NOTE — OP NOTE
ESOPHAGOGASTRODUODENOSCOPY PROCEDURE REPORT    Barrington Chao  8/24/2020    GASTROENTEROLOGIST:  Bulmaro Horton MD    PRE-PROCEDURE DIAGNOSIS:  Postprandial bloating and abdominal pain  Globus syndrome  Family history of gastric cancer    POST-PROCEDURE DIAGNOSIS:  1.-Normal esophagus.  Biopsied to rule out eosinophilic esophagitis  2.-  Normal stomach.  Biopsied to rule out H. pylori  3.-  Normal duodenum biopsied to rule out celiac disease  4.-  Globus syndrome likely due to chronic gastroesophageal reflux disease.      INDICATION:  As noted in the preprocedure diagnosis    Procedure(s):  EGD with biopsy    ANESTHESIA:  Propofol administered by anesthesia.  See anesthesia notes for ASA classification    STAFF:  Circulator: Sangeetha Maddox RN  Endo Technician: Jairon Rivers    FINDINGS:  As noted in the post procedure diagnosis    OPERATIVE PROCEDURE:  After proper informed consent was obtained, patient was transferred to the OR/endoscopy suite.  Patient was then placed in left lateral decubitus position. The Olympus 180 series video gastroscope was inserted orally under direct visualization.  Esophagus, stomach, and duodenum were inspected.  The endoscope was passed to the third portion of the duodenum.  Scope was retroflexed for visualization of the cardia and incisuraThe endoscope was then withdrawn. Patient tolerated the procedure well. There were no immediate complications.    ESTIMATED BLOOD LOSS:  None    SPECIMENS:  Esophageal biopsies rule out eosinophilic esophagitis  Antral biopsies to rule out H. pylori  Duodenal biopsies to rule out celiac disease               COMPLICATIONS;  None    RECOMMENDATIONS/ PLAN:  Await pathology report  Omeprazole 40 mg p.o. twice daily for 6 to 8 weeks  Return to GI clinic  Proceed with colonoscopy    Bulmaor Horton MD     08/24/20 4:22 PM

## 2020-08-24 NOTE — ANESTHESIA POSTPROCEDURE EVALUATION
Patient: Barrington Chao    Procedure Summary     Date:  08/24/20 Room / Location:   [facility]  /  [facility]    Anesthesia Start:  1612 Anesthesia Stop:  1647    Procedures:       ESOPHAGOGASTRODUODENOSCOPY WITH BIOPSY CPT CODE: 12435 (N/A Esophagus)      COLONOSCOPY FOR SCREENING CPT CODE:  (N/A ) Diagnosis:       Bloating      Encounter for colorectal cancer screening      (Bloating [R14.0])      (Encounter for colorectal cancer screening [Z12.11, Z12.12])    Surgeon:  Bulmaro Horton MD Provider:  Zack Koch MD    Anesthesia Type:  general ASA Status:  2          Anesthesia Type: general    Vitals  No vitals data found for the desired time range.          Post Anesthesia Care and Evaluation    Patient location during evaluation: bedside  Patient participation: complete - patient participated  Level of consciousness: awake and alert  Pain score: 1  Pain management: adequate  Airway patency: patent  Anesthetic complications: No anesthetic complications  PONV Status: none  Cardiovascular status: acceptable  Respiratory status: acceptable  Hydration status: acceptable

## 2020-08-24 NOTE — ANESTHESIA PREPROCEDURE EVALUATION
Anesthesia Evaluation     no history of anesthetic complications:  NPO Solid Status: > 8 hours  NPO Liquid Status: > 8 hours           Airway   Mallampati: II  TM distance: >3 FB  Neck ROM: full  No difficulty expected  Dental    (+) poor dentition    Pulmonary - normal exam   Cardiovascular - normal exam        Neuro/Psych  (+) psychiatric history Anxiety,     GI/Hepatic/Renal/Endo    (+)  GERD,  renal disease, thyroid problem hypothyroidism    Musculoskeletal     Abdominal  - normal exam   Substance History      OB/GYN          Other                      Anesthesia Plan    ASA 2     general     intravenous induction     Anesthetic plan, all risks, benefits, and alternatives have been provided, discussed and informed consent has been obtained with: patient.

## 2020-08-24 NOTE — OP NOTE
COLONOSCOPY PROCEDURE NOTE    Barrington Chao  8/24/2020    GASTROENTEROLOGIST:  Bulmaro Horton MD    PRE-PROCEDURE DIAGNOSIS:   Colon cancer screening    POST-PROCEDURE DIAGNOSIS:  1.-  Normal terminal ileum  2.-  Normal colon except for left-sided diverticulosis    INDICATION:  Routine colon cancer screening    PROCEDURE:  COLONOSCOPY diagnostic    ANESTHESIA:  Propofol administered by anesthesia.  See anesthesia notes for ASA classification    STAFF  Circulator: Sangeetha Maddox RN  Endo Technician: Jairon Rivers    Findings:  As noted in the post procedure diagnosis    OPERATIVE PROCEDURE   After proper informed consent was obtained, the patient was taken the operating suite and placed in left lateral decubitus position.  An Olympus video colonoscope 180 series was inserted in the rectum and advanced to the terminal ileum under direct visualization.  Cecum and terminal ileum were identified by visualization of the appendiceal orifice and ileocecal valve.  The colonoscope was then slowly withdrawn from the cecum to the rectum and passed a second time from rectum to cecum.  The colonoscope was retroflexed in the cecum and rectum. Scope was then withdrawn. Patient tolerated the procedure well. There were no immediate complications. Cecal withdrawal time was 9 minutes.    ESTIMATED BLOOD LOSS  None    SPECIMENS  None             COMPLICATIONS  None    RECOMMENDATIONS:  Repeat colonoscopy in 10 years    Bulmaro Horton MD  08/24/20 4:24 PM

## 2020-08-26 LAB
LAB AP CASE REPORT: NORMAL
PATH REPORT.FINAL DX SPEC: NORMAL

## 2020-08-31 ENCOUNTER — OFFICE VISIT (OUTPATIENT)
Dept: GASTROENTEROLOGY | Facility: CLINIC | Age: 55
End: 2020-08-31

## 2020-08-31 VITALS
WEIGHT: 192.6 LBS | BODY MASS INDEX: 25.53 KG/M2 | HEIGHT: 73 IN | DIASTOLIC BLOOD PRESSURE: 69 MMHG | HEART RATE: 61 BPM | OXYGEN SATURATION: 98 % | TEMPERATURE: 97.7 F | SYSTOLIC BLOOD PRESSURE: 108 MMHG

## 2020-08-31 DIAGNOSIS — K57.90 DIVERTICULOSIS: ICD-10-CM

## 2020-08-31 DIAGNOSIS — R10.84 GENERALIZED ABDOMINAL PAIN: Primary | ICD-10-CM

## 2020-08-31 DIAGNOSIS — F41.9 ANXIETY: ICD-10-CM

## 2020-08-31 DIAGNOSIS — K59.04 CHRONIC IDIOPATHIC CONSTIPATION: ICD-10-CM

## 2020-08-31 DIAGNOSIS — K21.9 GASTROESOPHAGEAL REFLUX DISEASE WITHOUT ESOPHAGITIS: ICD-10-CM

## 2020-08-31 PROCEDURE — 99214 OFFICE O/P EST MOD 30 MIN: CPT | Performed by: PHYSICIAN ASSISTANT

## 2020-08-31 RX ORDER — OMEPRAZOLE 40 MG/1
40 CAPSULE, DELAYED RELEASE ORAL
Qty: 180 CAPSULE | Refills: 1 | Status: SHIPPED | OUTPATIENT
Start: 2020-08-31 | End: 2020-11-19 | Stop reason: SDUPTHER

## 2020-08-31 RX ORDER — BUSPIRONE HYDROCHLORIDE 5 MG/1
5 TABLET ORAL 3 TIMES DAILY
Qty: 90 TABLET | Refills: 5 | Status: SHIPPED | OUTPATIENT
Start: 2020-08-31 | End: 2020-10-01 | Stop reason: SDUPTHER

## 2020-09-02 ENCOUNTER — OFFICE VISIT (OUTPATIENT)
Dept: FAMILY MEDICINE CLINIC | Facility: CLINIC | Age: 55
End: 2020-09-02

## 2020-09-02 VITALS
TEMPERATURE: 97.1 F | DIASTOLIC BLOOD PRESSURE: 70 MMHG | HEIGHT: 73 IN | BODY MASS INDEX: 25.45 KG/M2 | OXYGEN SATURATION: 98 % | WEIGHT: 192 LBS | HEART RATE: 64 BPM | SYSTOLIC BLOOD PRESSURE: 110 MMHG

## 2020-09-02 DIAGNOSIS — K80.20 GALLSTONES: Primary | ICD-10-CM

## 2020-09-02 DIAGNOSIS — F41.9 ANXIETY: ICD-10-CM

## 2020-09-02 DIAGNOSIS — R10.11 RUQ PAIN: ICD-10-CM

## 2020-09-02 DIAGNOSIS — E66.3 OVERWEIGHT: ICD-10-CM

## 2020-09-02 DIAGNOSIS — N28.1 RENAL CYST: ICD-10-CM

## 2020-09-02 DIAGNOSIS — M19.90 ARTHRITIS: ICD-10-CM

## 2020-09-02 PROBLEM — N28.9 RENAL INSUFFICIENCY: Status: RESOLVED | Noted: 2018-07-20 | Resolved: 2020-09-02

## 2020-09-02 PROCEDURE — 99214 OFFICE O/P EST MOD 30 MIN: CPT | Performed by: NURSE PRACTITIONER

## 2020-09-02 RX ORDER — ATENOLOL 25 MG/1
25 TABLET ORAL 2 TIMES DAILY PRN
Qty: 60 TABLET | Refills: 5 | Status: SHIPPED | OUTPATIENT
Start: 2020-09-02 | End: 2020-11-19 | Stop reason: SDUPTHER

## 2020-09-02 NOTE — ASSESSMENT & PLAN NOTE
Will repeat ultrasound and refer if indicated, discussed with patient that these are usually benign findings.

## 2020-09-02 NOTE — ASSESSMENT & PLAN NOTE
Refer to pain management.  Declined request for hydrocodone.  Ordered x-rays which patient will have performed at [facility].

## 2020-09-02 NOTE — ASSESSMENT & PLAN NOTE
Patient may use atenolol up to twice daily for acute anxiety attacks.  Educated him regarding the need to check his heart rate prior to taking this medication and do not take this medication if heart rate is less than 60.  Patient states understanding.  Has tolerated use of atenolol in the past.  Patient still has a bottle with a few remaining atenolol from May 2015 prescription.

## 2020-09-02 NOTE — PROGRESS NOTES
Subjective   Barrington Chao is a 54 y.o. male.     No chief complaint on file.    Knee pain  Abdominal pain    History of Present Illness:    Patient reports that he has gallstones.  Reports gallstones were present on some testing he had a couple of years ago.  He does have an occasional right upper quad pain with some nausea after eating.  He currently has insurance and would like to have his gallbladder checked as he can afford to have it taken out right now if needed.  Symptoms are worse after eating greasy meals.    Patient complains of arthritis pain which is rated 7 on pain scale rating of 0-10.  His pain is located in his low back and both knees.  Worked as a  until retiring recently.  Has had elevated liver enzymes in the past and tries to avoid taking Tylenol.  Has failed multiple over-the-counter treatments.  Patient in the past has taken an occasional Norco for his pain when it becomes severe.  He would like to be referred to a pain clinic for evaluation.    Anxiety- in the past patient used a beta-blocker on an as-needed basis for acute anxiety attacks.  He is able to demonstrate how to check his pulse rate.  Last prescription was 2015 and patient has a couple remaining atenolol from that prescription.  States that when he feels a panic attack coming on and can feel his heart rate going up he can take an atenolol and it helps control his anxiety.  He also has a BuSpar ordered for acute panic attacks.  He is scheduled to see a psychiatric nurse practitioner for evaluation and possible medication management.        The following portions of the patient's history and ROS were reviewed and updated as appropriate per provider:  Allergies, current medications, past family history, past medical history, past social history, past surgical history and problem list.    Review of Systems   Constitutional: Negative for activity change, chills, fatigue and fever.   HENT: Negative for congestion, ear pain,  "sinus pressure, sinus pain and sore throat.    Eyes: Negative for pain, discharge and itching.   Respiratory: Negative for apnea, choking and shortness of breath.    Gastrointestinal: Positive for abdominal pain and nausea. Negative for blood in stool, constipation, diarrhea and vomiting.   Endocrine: Negative.    Genitourinary: Negative for dysuria, flank pain, frequency and hematuria.   Musculoskeletal: Positive for arthralgias and back pain. Negative for neck pain and neck stiffness.   Skin: Negative.    Allergic/Immunologic: Negative for environmental allergies, food allergies and immunocompromised state.   Neurological: Negative for seizures, facial asymmetry and speech difficulty.   Hematological: Negative.    Psychiatric/Behavioral: Negative for dysphoric mood, self-injury and suicidal ideas.       Objective     /70   Pulse 64   Temp 97.1 °F (36.2 °C) (Temporal)   Ht 185.4 cm (73\")   Wt 87.1 kg (192 lb)   SpO2 98%   BMI 25.33 kg/m²   Admission on 08/24/2020, Discharged on 08/24/2020   Component Date Value Ref Range Status   • Case Report 08/24/2020    Final                    Value:Surgical Pathology Report                         Case: [id number]                                  Authorizing Provider:  Bulmaro Horton     Collected:           08/24/2020 04:18 PM                                 MD Jose Luis                                                                    Ordering Location:     [facility]      Received:            08/24/2020 04:56 PM                                 OPERATING ROOM DEPARTMENT                                                    Pathologist:           Cayla Michel DO                                                        Specimens:   1) - Small Intestine, Duodenum BX                                                                   2) - Gastric, Antrum, Antrum BX                                                                     3) - Esophagus, " Esophagus BX                                                              • Final Diagnosis 08/24/2020    Final                    Value:This result contains rich text formatting which cannot be displayed here.       Physical Exam   Constitutional: He is oriented to person, place, and time. Vital signs are normal. He appears well-developed and well-nourished. He is cooperative. He does not have a sickly appearance. He does not appear ill. No distress.   HENT:   Head: Normocephalic. Hair is normal.   Right Ear: Tympanic membrane, external ear and ear canal normal.   Left Ear: Tympanic membrane and external ear normal.   Nose: Nose normal. Right sinus exhibits no maxillary sinus tenderness and no frontal sinus tenderness. Left sinus exhibits no maxillary sinus tenderness and no frontal sinus tenderness.   Mouth/Throat: Oropharynx is clear and moist. No oropharyngeal exudate.   Lowered mask for brief oral exam   Eyes: Pupils are equal, round, and reactive to light. Conjunctivae, EOM and lids are normal. Right eye exhibits no discharge. Left eye exhibits no discharge.   Neck: Normal range of motion. Neck supple. No spinous process tenderness present. No tracheal deviation and normal range of motion present. No thyromegaly present.   Cardiovascular: Normal rate, regular rhythm, normal heart sounds and normal pulses. Exam reveals no gallop and no friction rub.   No murmur heard.  Pulmonary/Chest: Effort normal and breath sounds normal. No respiratory distress. He has no wheezes. He has no rales. He exhibits no tenderness.   Abdominal: Soft. Normal appearance and bowel sounds are normal. He exhibits no distension and no mass. There is no hepatosplenomegaly, splenomegaly or hepatomegaly. There is no tenderness. There is no rigidity, no rebound and no guarding.   Musculoskeletal: Normal range of motion.        Right knee: He exhibits normal range of motion.        Left knee: He exhibits normal range of motion.        Lumbar  back: He exhibits tenderness. He exhibits normal range of motion.   Lymphadenopathy:     He has no cervical adenopathy.   Neurological: He is alert and oriented to person, place, and time. He has normal reflexes.   CN 2-12 grossly intact    Skin: Skin is warm and dry. Capillary refill takes less than 2 seconds. No rash noted. He is not diaphoretic. No erythema.   Psychiatric: He has a normal mood and affect. His speech is normal and behavior is normal. Judgment and thought content normal. His affect is not inappropriate. Cognition and memory are normal. He is attentive.   Vitals reviewed.      Assessment/Plan     Problem List Items Addressed This Visit        Digestive    Gallstones - Primary    Relevant Orders    US Abdomen Complete    NM HIDA scan with pharmacological intervention       Nervous and Auditory    RUQ pain    Relevant Orders    NM HIDA scan with pharmacological intervention       Musculoskeletal and Integument    Arthritis    Overview     Chronic knee pain         Current Assessment & Plan     Refer to pain management.  Declined request for hydrocodone.  Ordered x-rays which patient will have performed at [facility].         Relevant Orders    Ambulatory Referral to Pain Management    XR Knee 3 View Left    XR Knee 3 View Right    XR Spine Lumbar 2 or 3 View       Genitourinary    Renal cyst    Overview     Found during liver ultrasound         Current Assessment & Plan     Will repeat ultrasound and refer if indicated, discussed with patient that these are usually benign findings.            Other    Anxiety    Current Assessment & Plan     Patient may use atenolol up to twice daily for acute anxiety attacks.  Educated him regarding the need to check his heart rate prior to taking this medication and do not take this medication if heart rate is less than 60.  Patient states understanding.  Has tolerated use of atenolol in the past.  Patient still has a bottle with a few remaining atenolol from  May 2015 prescription.         Relevant Medications    atenolol (Tenormin) 25 MG tablet    Overweight    Current Assessment & Plan     Obesity is unchanged.  Discussed the patient's BMI.  The BMI is above average; BMI management plan is completed.  General weight loss/lifestyle modification strategies discussed (elicit support from others; identify saboteurs; non-food rewards, etc).               Declined request for controlled substance.  Patient will need to see psychiatric nurse practitioner for evaluation in order to receive clonazepam.  I have referred him to pain management for further evaluation of his chronic pain.  X-rays have been ordered which I have instructed him to have performed this week at [facility].  I have ordered an ultrasound of the abdomen as well as a HIDA scan.  We will notify the patient once that is scheduled.  I have also advised him to avoid greasy foods.         Patient's Body mass index is 25.33 kg/m². BMI is above normal parameters. Recommendations include: exercise counseling and nutrition counseling.      I have discussed diagnosis in detail today allowing time for questions and answers. Patient is aware of reasons to seek urgent or emergent medical care as well as reasons to return to the clinic for evaluation. Possible side effects, interactions and progression of symptoms discussed as well. Patient / family states understanding.   Emotional support and active listening provided.       Coronavirus precautions have been reviewed and discussed.  I have discussed the CDC recommendations  of social distancing, hand washing, wearing mask and disinfecting commonly touched items. Reviewed need to notify PCP and self quarantine with mild symptoms.  Discussed procedure to obtain Covid-19 testing and notification of PCP/health dept/ED/Urgent Center if symptoms begin. Understanding verbalized.    Follow up in 2-3 months, sooner if needed.  I will have him come back sooner if testing  indicates.      This document has been electronically signed by:  DIPTI Jiménez, NP-C

## 2020-09-04 ENCOUNTER — CLINICAL SUPPORT (OUTPATIENT)
Dept: FAMILY MEDICINE CLINIC | Facility: CLINIC | Age: 55
End: 2020-09-04

## 2020-09-04 DIAGNOSIS — Z23 ENCOUNTER FOR IMMUNIZATION: Primary | ICD-10-CM

## 2020-09-04 PROCEDURE — 90471 IMMUNIZATION ADMIN: CPT | Performed by: NURSE PRACTITIONER

## 2020-09-04 PROCEDURE — 90636 HEP A/HEP B VACC ADULT IM: CPT | Performed by: NURSE PRACTITIONER

## 2020-09-08 ENCOUNTER — TELEPHONE (OUTPATIENT)
Dept: GASTROENTEROLOGY | Facility: CLINIC | Age: 55
End: 2020-09-08

## 2020-09-15 ENCOUNTER — APPOINTMENT (OUTPATIENT)
Dept: ULTRASOUND IMAGING | Facility: HOSPITAL | Age: 55
End: 2020-09-15

## 2020-09-15 ENCOUNTER — TELEPHONE (OUTPATIENT)
Dept: GASTROENTEROLOGY | Facility: CLINIC | Age: 55
End: 2020-09-15

## 2020-09-15 ENCOUNTER — APPOINTMENT (OUTPATIENT)
Dept: NUCLEAR MEDICINE | Facility: HOSPITAL | Age: 55
End: 2020-09-15

## 2020-09-15 DIAGNOSIS — K59.04 CHRONIC IDIOPATHIC CONSTIPATION: Primary | ICD-10-CM

## 2020-09-15 NOTE — TELEPHONE ENCOUNTER
Patient's wife said insurance is not covering plecanatide and is requesting a new medication if possible.

## 2020-09-21 ENCOUNTER — DOCUMENTATION (OUTPATIENT)
Dept: GASTROENTEROLOGY | Facility: CLINIC | Age: 55
End: 2020-09-21

## 2020-09-21 NOTE — PROGRESS NOTES
Patient's wife called and wanted to check on RX that was sent and needed a PA. I spoke with Princess and she said it cam back denied and it stated he needed to try Amitiza or Linzess. She said she was goong to let Jacquelyn know this information and to watch at pharmacy and please let our office know if if they need anything else. She voiced understanding.

## 2020-09-22 ENCOUNTER — OFFICE VISIT (OUTPATIENT)
Dept: UROLOGY | Facility: CLINIC | Age: 55
End: 2020-09-22

## 2020-09-22 VITALS — BODY MASS INDEX: 27.09 KG/M2 | HEIGHT: 72 IN | TEMPERATURE: 98 F | WEIGHT: 200 LBS

## 2020-09-22 DIAGNOSIS — N39.43 BENIGN PROSTATIC HYPERPLASIA WITH POST-VOID DRIBBLING: Primary | ICD-10-CM

## 2020-09-22 DIAGNOSIS — N40.1 BENIGN PROSTATIC HYPERPLASIA WITH POST-VOID DRIBBLING: Primary | ICD-10-CM

## 2020-09-22 PROCEDURE — 99213 OFFICE O/P EST LOW 20 MIN: CPT | Performed by: UROLOGY

## 2020-09-22 NOTE — PROGRESS NOTES
Chief Complaint:          Chief Complaint   Patient presents with   • Urinary Frequency   • Benign Prostatic Hypertrophy       HPI:   54 y.o. male returns today he is got a nice improvement with alpha blockade but given the PSA at 2.590 and the fact that he does not have a complete resolution I would recommend finasteride.  I will see him back in 3 months this way we will be able to eventually wean off the Flomax.      Past Medical History:        Past Medical History:   Diagnosis Date   • Anxiety    • Arthritis     recent onset of joint stiffness   • Disease of thyroid gland    • Gallstones 9/2/2020   • GERD (gastroesophageal reflux disease)          Current Meds:     Current Outpatient Medications   Medication Sig Dispense Refill   • allopurinol (ZYLOPRIM) 100 MG tablet Take 1 tablet by mouth 2 (Two) Times a Day. 60 tablet 5   • atenolol (Tenormin) 25 MG tablet Take 1 tablet by mouth 2 (Two) Times a Day As Needed (anxiety, do not take if heart rate less than 60). 60 tablet 5   • busPIRone (BUSPAR) 5 MG tablet Take 1 tablet by mouth 3 (Three) Times a Day. 90 tablet 5   • escitalopram (LEXAPRO) 10 MG tablet Take 1 tablet by mouth Daily. 30 tablet 5   • famciclovir (FAMVIR) 500 MG tablet Take 1 tablet by mouth 2 (Two) Times a Day. 60 tablet 5   • levothyroxine (Synthroid) 75 MCG tablet Take 1 tablet by mouth Daily. 30 tablet 5   • omeprazole (priLOSEC) 40 MG capsule Take 1 capsule by mouth 2 (Two) Times a Day Before Meals. Take a half hour before breakfast 180 capsule 1   • Plecanatide 3 MG tablet Take 1 tablet by mouth Daily. 30 tablet 5   • sildenafil (REVATIO) 20 MG tablet Take 1 tablet by mouth Daily. 30 tablet 05   • tamsulosin (Flomax) 0.4 MG capsule 24 hr capsule Take 1 capsule by mouth Every Night. 30 capsule 5     No current facility-administered medications for this visit.         Allergies:      No Known Allergies     Past Surgical History:     Past Surgical History:   Procedure Laterality Date   •  COLONOSCOPY N/A 8/24/2020    Procedure: COLONOSCOPY FOR SCREENING CPT CODE: ;  Surgeon: Bulmaro Horton MD;  Location: [facility] OR;  Service: Gastroenterology;  Laterality: N/A;   • ENDOSCOPY N/A 8/24/2020    Procedure: ESOPHAGOGASTRODUODENOSCOPY WITH BIOPSY CPT CODE: 12223;  Surgeon: Bulmaro Horton MD;  Location: [facility] OR;  Service: Gastroenterology;  Laterality: N/A;         Social History:     Social History     Socioeconomic History   • Marital status:      Spouse name: nguyen   • Number of children: 2   • Years of education: 18   • Highest education level: Not on file   Occupational History   • Occupation: [employer]   Tobacco Use   • Smoking status: Never Smoker   • Smokeless tobacco: Never Used   Substance and Sexual Activity   • Alcohol use: No   • Drug use: No   • Sexual activity: Defer       Family History:     Family History   Problem Relation Age of Onset   • Arthritis Mother    • Cancer Mother    • Thyroid disease Mother    • Diabetes Brother    • Stroke Paternal Grandmother    • Thyroid disease Paternal Grandmother        Review of Systems:     Review of Systems   Constitutional: Negative.    HENT: Negative.    Eyes: Negative.    Respiratory: Negative.    Cardiovascular: Negative.    Gastrointestinal: Negative.    Endocrine: Negative.    Musculoskeletal: Negative.    Allergic/Immunologic: Negative.    Neurological: Negative.    Hematological: Negative.    Psychiatric/Behavioral: Negative.        Physical Exam:     Physical Exam  Vitals signs and nursing note reviewed.   Constitutional:       Appearance: He is well-developed.   HENT:      Head: Normocephalic and atraumatic.   Eyes:      Conjunctiva/sclera: Conjunctivae normal.      Pupils: Pupils are equal, round, and reactive to light.   Neck:      Musculoskeletal: Normal range of motion.   Cardiovascular:      Rate and Rhythm: Normal rate and regular rhythm.      Heart sounds: Normal heart sounds.      Pulmonary:      Effort: Pulmonary effort is normal.      Breath sounds: Normal breath sounds.   Abdominal:      General: Bowel sounds are normal.      Palpations: Abdomen is soft.   Musculoskeletal: Normal range of motion.   Skin:     General: Skin is warm and dry.   Neurological:      Mental Status: He is alert and oriented to person, place, and time.      Deep Tendon Reflexes: Reflexes are normal and symmetric.   Psychiatric:         Behavior: Behavior normal.         Thought Content: Thought content normal.         Judgment: Judgment normal.         I have reviewed the following portions of the patient's history: allergies, current medications, past family history, past medical history, past social history, past surgical history, problem list and ROS and confirm it's accurate.      Procedure:       Assessment/Plan:   BPH: Discussed the pathophysiology of BPH and obstruction.  We discussed the static and dynamic effect effects of BPH as well as using 5 alpha reductase inhibitors versus alpha blockade.  We discussed the indications for transurethral surgery as well.  And/ or other therapeutic options available including all of the newer techniques.  Got about a 70% response with alpha blockade I will make the addition of finasteride            Patient's Body mass index is 27.12 kg/m². BMI is above normal parameters. Recommendations include: educational material.              This document has been electronically signed by TRISTIN MACKEY MD September 22, 2020 14:56 EDT

## 2020-09-22 NOTE — TELEPHONE ENCOUNTER
I had prescribed Trulance. I have sent Linzess, please let him know and call back in 1 week with progress.

## 2020-09-23 ENCOUNTER — HOSPITAL ENCOUNTER (OUTPATIENT)
Dept: NUCLEAR MEDICINE | Facility: HOSPITAL | Age: 55
Discharge: HOME | End: 2020-09-23

## 2020-09-23 ENCOUNTER — HOSPITAL ENCOUNTER (OUTPATIENT)
Dept: ULTRASOUND IMAGING | Facility: HOSPITAL | Age: 55
Discharge: HOME | End: 2020-09-23

## 2020-09-23 DIAGNOSIS — R10.11 RUQ PAIN: ICD-10-CM

## 2020-09-23 DIAGNOSIS — K80.20 GALLSTONES: ICD-10-CM

## 2020-09-23 PROCEDURE — 0 TECHNETIUM TC 99M MEBROFENIN KIT: Performed by: NURSE PRACTITIONER

## 2020-09-23 PROCEDURE — 76700 US EXAM ABDOM COMPLETE: CPT

## 2020-09-23 PROCEDURE — A9537 TC99M MEBROFENIN: HCPCS | Performed by: NURSE PRACTITIONER

## 2020-09-23 PROCEDURE — 78226 HEPATOBILIARY SYSTEM IMAGING: CPT | Performed by: RADIOLOGY

## 2020-09-23 PROCEDURE — 76700 US EXAM ABDOM COMPLETE: CPT | Performed by: RADIOLOGY

## 2020-09-23 PROCEDURE — 78226 HEPATOBILIARY SYSTEM IMAGING: CPT

## 2020-09-23 RX ORDER — KIT FOR THE PREPARATION OF TECHNETIUM TC 99M MEBROFENIN 45 MG/10ML
1 INJECTION, POWDER, LYOPHILIZED, FOR SOLUTION INTRAVENOUS
Status: COMPLETED | OUTPATIENT
Start: 2020-09-23 | End: 2020-09-23

## 2020-09-23 RX ADMIN — MEBROFENIN 1 DOSE: 45 INJECTION, POWDER, LYOPHILIZED, FOR SOLUTION INTRAVENOUS at 11:45

## 2020-09-24 ENCOUNTER — TELEPHONE (OUTPATIENT)
Dept: FAMILY MEDICINE CLINIC | Facility: CLINIC | Age: 55
End: 2020-09-24

## 2020-09-24 DIAGNOSIS — N28.1 RENAL CYST: ICD-10-CM

## 2020-09-24 DIAGNOSIS — R94.8 ABNORMAL BILIARY HIDA SCAN: Primary | ICD-10-CM

## 2020-09-24 NOTE — TELEPHONE ENCOUNTER
----- Message from DIPTI Perez sent at 9/23/2020  4:55 PM EDT -----  Patient needs his gallbladder out.  Please schedule with [facility] for gallbladder removal.  Notify patient.

## 2020-09-28 ENCOUNTER — TELEPHONE (OUTPATIENT)
Dept: UROLOGY | Facility: CLINIC | Age: 55
End: 2020-09-28

## 2020-09-28 ENCOUNTER — OFFICE VISIT (OUTPATIENT)
Dept: PSYCHIATRY | Facility: CLINIC | Age: 55
End: 2020-09-28

## 2020-09-28 VITALS
WEIGHT: 202 LBS | SYSTOLIC BLOOD PRESSURE: 148 MMHG | BODY MASS INDEX: 27.4 KG/M2 | HEART RATE: 57 BPM | DIASTOLIC BLOOD PRESSURE: 96 MMHG

## 2020-09-28 DIAGNOSIS — R05.9 COUGH: Primary | ICD-10-CM

## 2020-09-28 DIAGNOSIS — M79.671 RIGHT FOOT PAIN: ICD-10-CM

## 2020-09-28 DIAGNOSIS — F41.0 PANIC DISORDER WITHOUT AGORAPHOBIA: ICD-10-CM

## 2020-09-28 DIAGNOSIS — F39 UNSPECIFIED MOOD (AFFECTIVE) DISORDER (HCC): ICD-10-CM

## 2020-09-28 DIAGNOSIS — F41.9 ANXIETY DISORDER, UNSPECIFIED TYPE: ICD-10-CM

## 2020-09-28 DIAGNOSIS — Z79.899 HIGH RISK MEDICATION USE: Primary | ICD-10-CM

## 2020-09-28 PROCEDURE — 90792 PSYCH DIAG EVAL W/MED SRVCS: CPT | Performed by: NURSE PRACTITIONER

## 2020-09-28 RX ORDER — HYDROCODONE BITARTRATE AND ACETAMINOPHEN 7.5; 325 MG/1; MG/1
TABLET ORAL
COMMUNITY
Start: 2020-08-26 | End: 2021-05-25

## 2020-09-30 ENCOUNTER — HOSPITAL ENCOUNTER (OUTPATIENT)
Dept: GENERAL RADIOLOGY | Facility: HOSPITAL | Age: 55
Discharge: HOME | End: 2020-09-30
Admitting: NURSE PRACTITIONER

## 2020-09-30 DIAGNOSIS — R05.9 COUGH: ICD-10-CM

## 2020-09-30 DIAGNOSIS — M79.671 RIGHT FOOT PAIN: ICD-10-CM

## 2020-09-30 DIAGNOSIS — M19.90 ARTHRITIS: ICD-10-CM

## 2020-09-30 PROCEDURE — 72100 X-RAY EXAM L-S SPINE 2/3 VWS: CPT

## 2020-09-30 PROCEDURE — 73562 X-RAY EXAM OF KNEE 3: CPT | Performed by: RADIOLOGY

## 2020-09-30 PROCEDURE — 73562 X-RAY EXAM OF KNEE 3: CPT

## 2020-09-30 PROCEDURE — 73610 X-RAY EXAM OF ANKLE: CPT

## 2020-09-30 PROCEDURE — 73630 X-RAY EXAM OF FOOT: CPT

## 2020-09-30 PROCEDURE — 71046 X-RAY EXAM CHEST 2 VIEWS: CPT | Performed by: RADIOLOGY

## 2020-09-30 PROCEDURE — 72100 X-RAY EXAM L-S SPINE 2/3 VWS: CPT | Performed by: RADIOLOGY

## 2020-09-30 PROCEDURE — 73610 X-RAY EXAM OF ANKLE: CPT | Performed by: RADIOLOGY

## 2020-09-30 PROCEDURE — 71046 X-RAY EXAM CHEST 2 VIEWS: CPT

## 2020-09-30 PROCEDURE — 73630 X-RAY EXAM OF FOOT: CPT | Performed by: RADIOLOGY

## 2020-10-01 ENCOUNTER — TELEPHONE (OUTPATIENT)
Dept: FAMILY MEDICINE CLINIC | Facility: CLINIC | Age: 55
End: 2020-10-01

## 2020-10-01 DIAGNOSIS — F41.0 PANIC DISORDER: ICD-10-CM

## 2020-10-01 DIAGNOSIS — M77.9 BONE SPUR: Primary | ICD-10-CM

## 2020-10-01 DIAGNOSIS — F41.1 GENERALIZED ANXIETY DISORDER: ICD-10-CM

## 2020-10-01 DIAGNOSIS — F39 UNSPECIFIED MOOD (AFFECTIVE) DISORDER (HCC): Primary | ICD-10-CM

## 2020-10-01 DIAGNOSIS — F41.9 ANXIETY: ICD-10-CM

## 2020-10-01 RX ORDER — ESCITALOPRAM OXALATE 20 MG/1
20 TABLET ORAL DAILY
Qty: 30 TABLET | Refills: 0 | Status: SHIPPED | OUTPATIENT
Start: 2020-10-01 | End: 2020-11-19 | Stop reason: SDUPTHER

## 2020-10-01 RX ORDER — BUSPIRONE HYDROCHLORIDE 10 MG/1
TABLET ORAL
Qty: 60 TABLET | Refills: 0 | Status: SHIPPED | OUTPATIENT
Start: 2020-10-01 | End: 2020-11-19 | Stop reason: SDUPTHER

## 2020-10-01 NOTE — TELEPHONE ENCOUNTER
----- Message from DIPTI Perez sent at 9/30/2020  4:19 PM EDT -----  Bone spurs, refer to podiatry

## 2020-10-01 NOTE — PROGRESS NOTES
10/01/20 at 4:29pm spoke with Ferris over telephone regarding medication adjustment and consultation with PCP. Discussed with patient will increase Lexapro to 20mg as well as increase Buspar to 5mg in am 10mg in the afternoon and 5mg in the evening for worsening anxiety and panic disorder. Patient stated he wanted to be started on Klonopin due to he knows that will prevent the panic attacks. Discussed with patient risks associated with long term benzodiazapine use and achieving stability with other medication options.  Discussed with patient if having any questions, concerns, or worsening of symptoms to call the office. Discussed with patient risks, benefits, and consequences of medications. Discussed with patient if begin having any SI or HI to go to the nearest ER or call 911. Patient acknowledged and agreed.

## 2020-10-05 ENCOUNTER — CLINICAL SUPPORT (OUTPATIENT)
Dept: FAMILY MEDICINE CLINIC | Facility: CLINIC | Age: 55
End: 2020-10-05

## 2020-10-05 DIAGNOSIS — Z23 ENCOUNTER FOR IMMUNIZATION: Primary | ICD-10-CM

## 2020-10-05 PROCEDURE — 90471 IMMUNIZATION ADMIN: CPT | Performed by: PHYSICIAN ASSISTANT

## 2020-10-05 PROCEDURE — 90636 HEP A/HEP B VACC ADULT IM: CPT | Performed by: PHYSICIAN ASSISTANT

## 2020-10-07 ENCOUNTER — OFFICE VISIT (OUTPATIENT)
Dept: FAMILY MEDICINE CLINIC | Facility: CLINIC | Age: 55
End: 2020-10-07

## 2020-10-07 VITALS — WEIGHT: 202 LBS | BODY MASS INDEX: 27.4 KG/M2

## 2020-10-07 DIAGNOSIS — N28.1 RENAL CYST: ICD-10-CM

## 2020-10-07 DIAGNOSIS — E66.3 OVERWEIGHT: ICD-10-CM

## 2020-10-07 DIAGNOSIS — R74.8 ELEVATED LIVER ENZYMES: ICD-10-CM

## 2020-10-07 DIAGNOSIS — R06.02 SHORT OF BREATH ON EXERTION: Primary | ICD-10-CM

## 2020-10-07 DIAGNOSIS — E78.2 MIXED HYPERLIPIDEMIA: ICD-10-CM

## 2020-10-07 DIAGNOSIS — E03.9 ACQUIRED HYPOTHYROIDISM: ICD-10-CM

## 2020-10-07 DIAGNOSIS — N40.1 BENIGN PROSTATIC HYPERPLASIA WITH POST-VOID DRIBBLING: ICD-10-CM

## 2020-10-07 DIAGNOSIS — M10.00 IDIOPATHIC GOUT, UNSPECIFIED CHRONICITY, UNSPECIFIED SITE: ICD-10-CM

## 2020-10-07 DIAGNOSIS — N39.43 BENIGN PROSTATIC HYPERPLASIA WITH POST-VOID DRIBBLING: ICD-10-CM

## 2020-10-07 PROCEDURE — 99214 OFFICE O/P EST MOD 30 MIN: CPT | Performed by: NURSE PRACTITIONER

## 2020-10-07 RX ORDER — ROSUVASTATIN CALCIUM 5 MG/1
5 TABLET, COATED ORAL NIGHTLY
Qty: 90 TABLET | Refills: 3 | Status: SHIPPED | OUTPATIENT
Start: 2020-10-07 | End: 2020-11-19 | Stop reason: SDUPTHER

## 2020-10-07 RX ORDER — FEBUXOSTAT 40 MG/1
40 TABLET, FILM COATED ORAL DAILY
Qty: 90 TABLET | Refills: 3 | Status: SHIPPED | OUTPATIENT
Start: 2020-10-07 | End: 2020-11-19 | Stop reason: SDUPTHER

## 2020-10-07 NOTE — PROGRESS NOTES
Subjective   Barrington Chao is a 54 y.o. male.     No chief complaint on file.    Chief complaints  I want to try a new gout medicine  I want a spirometry test  When a my getting my nephrology appointment    History of Present Illness:    This was an audio and video enabled telemedicine encounter.    psa - normal, has BPH    Stool samples normal, patient is worried that he may have some type of parasite or worm because he has a dog    Hyperlipidemia - elevated total and LDL, exercises routinely.  Not on any current medication.    B-12 - took vitamin the day before    Vit d def - controlled with MVI     Not diabetic - normal A 1c, no specific diet.  No signs of hypoglycemia or hyperglycemia.    Wants to do breathing test to see how he breaths. Feels short of breath at times when he jogs or after exercise.     Gout - frequent flares, wants to discuss medication changes.   tsh normal    Elevated liver enzymes - hep panel negative. No fatty liver. No alcohol or drug use.  Not taking tylenol . Occasional nsaid use.      The following portions of the patient's history and ROS were reviewed and updated as appropriate per provider:  Allergies, current medications, past family history, past medical history, past social history, past surgical history and problem list.    Review of Systems   Constitutional: Negative for activity change, appetite change, chills, fatigue, fever and unexpected weight change.   HENT: Negative for congestion, sinus pressure, sinus pain, sneezing and sore throat.    Eyes: Negative for pain, discharge and itching.   Respiratory: Positive for chest tightness and shortness of breath. Negative for cough, choking and wheezing.    Cardiovascular: Negative.    Gastrointestinal: Negative for abdominal distention, abdominal pain, rectal pain and vomiting.   Endocrine: Negative.    Genitourinary: Negative for difficulty urinating, dysuria and flank pain.   Musculoskeletal: Positive for arthralgias, back pain  and joint swelling (Ankles, at times). Negative for neck pain and neck stiffness.   Skin: Negative.    Allergic/Immunologic: Negative.    Neurological: Negative for dizziness, seizures, facial asymmetry and speech difficulty.   Hematological: Negative.    Psychiatric/Behavioral: Negative for behavioral problems, decreased concentration, self-injury and suicidal ideas. The patient is nervous/anxious (Under the care of psychiatry).        Objective     Wt 91.6 kg (202 lb)   BMI 27.40 kg/m²   Admission on 08/24/2020, Discharged on 08/24/2020   Component Date Value Ref Range Status   • Case Report 08/24/2020    Final                    Value:Surgical Pathology Report                         Case: [id number]                                  Authorizing Provider:  Bulmaro Horton     Collected:           08/24/2020 04:18 PM                                 MD Jose Luis                                                                    Ordering Location:     [facility]      Received:            08/24/2020 04:56 PM                                 OPERATING ROOM DEPARTMENT                                                    Pathologist:           Cayla Michel DO                                                        Specimens:   1) - Small Intestine, Duodenum BX                                                                   2) - Gastric, Antrum, Antrum BX                                                                     3) - Esophagus, Esophagus BX                                                              • Final Diagnosis 08/24/2020    Final                    Value:This result contains rich text formatting which cannot be displayed here.       Physical Exam  Constitutional:       General: He is not in acute distress.     Appearance: Normal appearance. He is well-developed and normal weight. He is not ill-appearing, toxic-appearing or diaphoretic.      Comments: Patient has mildly elevated BMI  but appears   HENT:      Head: Atraumatic. Hair is normal.   Eyes:      General: Lids are normal. Lids are everted, no foreign bodies appreciated. Vision grossly intact.   Neck:      Musculoskeletal: Normal range of motion.   Pulmonary:      Effort: No accessory muscle usage or respiratory distress.   Abdominal:      General: Abdomen is flat.   Musculoskeletal:      Comments: Full range of motion, going from sitting to standing position   Lymphadenopathy:      Cervical: No cervical adenopathy.      Comments: No obvious cervical adenopathy   Skin:     Coloration: Skin is not ashen, cyanotic, jaundiced or pale.      Findings: No rash.      Nails: There is no clubbing.     Neurological:      Mental Status: He is alert and oriented to person, place, and time.   Psychiatric:         Attention and Perception: Attention normal.         Mood and Affect: Mood normal.         Speech: Speech normal.         Behavior: Behavior is cooperative.         Assessment/Plan     Problem List Items Addressed This Visit        Cardiovascular and Mediastinum    Mixed hyperlipidemia    Relevant Medications    rosuvastatin (Crestor) 5 MG tablet    Other Relevant Orders    CBC & Differential    Comprehensive Metabolic Panel    TSH    Hemoglobin A1c    Vitamin D 25 Hydroxy    Vitamin B12    Lipid Panel    Uric Acid       Endocrine    Acquired hypothyroidism    Current Assessment & Plan     TSH normal, continue Synthroid         Relevant Orders    CBC & Differential    Comprehensive Metabolic Panel    TSH    Hemoglobin A1c    Vitamin D 25 Hydroxy    Vitamin B12    Lipid Panel    Uric Acid       Genitourinary    Benign prostatic hyperplasia with post-void dribbling    Relevant Orders    CBC & Differential    Comprehensive Metabolic Panel    TSH    Hemoglobin A1c    Vitamin D 25 Hydroxy    Vitamin B12    Lipid Panel    Uric Acid    Renal cyst    Overview     Found during liver ultrasound         Current Assessment & Plan     Mildly decreased GFR.   Awaiting appointment with nephrology.  Staff reminded to check on appointment.            Other    Elevated liver enzymes    Relevant Orders    Comprehensive Metabolic Panel    Overweight    Current Assessment & Plan     Obesity is unchanged.  Discussed the patient's BMI.  The BMI is above average; BMI management plan is completed.  General weight loss/lifestyle modification strategies discussed (elicit support from others; identify saboteurs; non-food rewards, etc).  Informal exercise measures discussed, e.g. taking stairs instead of elevator.           Other Visit Diagnoses     Short of breath on exertion    -  Primary    Relevant Orders    Ambulatory Referral to Pulmonology    Idiopathic gout, unspecified chronicity, unspecified site        Relevant Medications    febuxostat (ULORIC) 40 MG tablet    Other Relevant Orders    CBC & Differential    Comprehensive Metabolic Panel    TSH    Hemoglobin A1c    Vitamin D 25 Hydroxy    Vitamin B12    Lipid Panel    Uric Acid            I have reviewed medication list and discussed with patient the possible side effects and interactions.  We have discussed purpose for medication, route, dosage, frequency.  Refill routine medications.    Recent labs reviewed and discussed with patient.  Avoid Tylenol products.  I would like to repeat his labs in 2 months.  Remain under the care of GI.  Recent GI consult discussed.  Will refer him to pulmonology at his request for additional respiratory work-up if indicated.  Stop allopurinol.  Start Uloric.  Start Crestor 5 mg at bedtime.  Reviewed possible side effects and reasons to stop medications.  Discussed signs and symptoms to report to provider.       Patient's Body mass index is 27.4 kg/m². BMI is above normal parameters. Recommendations include: exercise counseling and nutrition counseling.    Pt has been instructed today regarding low fat heart smart diet. Weight management and routine exercise has been recommended. Avoid high  fat foods, starchy foods and processed foods. Increase lean meats, fresh vegetables and fresh fruits.     Coronavirus precautions have been reviewed and discussed.  I have discussed the CDC recommendations  of social distancing, hand washing, wearing mask and disinfecting commonly touched items. Reviewed need to notify PCP and self quarantine with mild symptoms.  Discussed procedure to obtain Covid-19 testing and notification of PCP/health dept/ED/Urgent Center if symptoms begin. Understanding verbalized.         I have discussed diagnosis in detail today allowing time for questions and answers. Patient is aware of reasons to seek urgent or emergent medical care as well as reasons to return to the clinic for evaluation. Possible side effects, interactions and progression of symptoms discussed as well. Patient / family states understanding.   Emotional support and active listening provided.     Follow-up in 2 months, repeat labs at that visit.  Sooner if needed.    This visit has been rescheduled as a phone visit to comply with patient safety concerns in accordance with CDC recommendations. Total time of discussion was 16 minutes.        This document has been electronically signed by:  DIPTI Jiménez, NP-C

## 2020-10-07 NOTE — ASSESSMENT & PLAN NOTE
Mildly decreased GFR.  Awaiting appointment with nephrology.  Staff reminded to check on appointment.

## 2020-10-08 ENCOUNTER — PRIOR AUTHORIZATION (OUTPATIENT)
Dept: FAMILY MEDICINE CLINIC | Facility: CLINIC | Age: 55
End: 2020-10-08

## 2020-10-13 DIAGNOSIS — N28.1 RENAL CYST: ICD-10-CM

## 2020-10-13 DIAGNOSIS — N40.1 BENIGN PROSTATIC HYPERPLASIA WITH POST-VOID DRIBBLING: Primary | ICD-10-CM

## 2020-10-13 DIAGNOSIS — N39.43 BENIGN PROSTATIC HYPERPLASIA WITH POST-VOID DRIBBLING: Primary | ICD-10-CM

## 2020-10-14 ENCOUNTER — OFFICE VISIT (OUTPATIENT)
Dept: SURGERY | Facility: CLINIC | Age: 55
End: 2020-10-14

## 2020-10-14 VITALS
SYSTOLIC BLOOD PRESSURE: 122 MMHG | HEIGHT: 72 IN | BODY MASS INDEX: 26.14 KG/M2 | WEIGHT: 193 LBS | HEART RATE: 67 BPM | DIASTOLIC BLOOD PRESSURE: 81 MMHG

## 2020-10-14 DIAGNOSIS — K82.8 BILIARY DYSKINESIA: Primary | ICD-10-CM

## 2020-10-14 PROCEDURE — 99243 OFF/OP CNSLTJ NEW/EST LOW 30: CPT | Performed by: SURGERY

## 2020-10-14 RX ORDER — INDOCYANINE GREEN AND WATER 25 MG
7.5 KIT INJECTION ONCE
Status: CANCELLED | OUTPATIENT
Start: 2020-11-20 | End: 2020-10-14

## 2020-10-14 RX ORDER — ONDANSETRON 2 MG/ML
4 INJECTION INTRAMUSCULAR; INTRAVENOUS EVERY 6 HOURS PRN
Status: CANCELLED | OUTPATIENT
Start: 2020-10-14

## 2020-10-14 NOTE — PROGRESS NOTES
Subjective   Barrington Chao is a 54 y.o. male is being seen for consultation today at the request of Lisa Dominguez APRN    Barrington Chao is a 54 y.o. male With postprandial bloating.  Right upper quadrant pain also reported.  No nausea or vomiting.  HIDA scan shows ejection fraction less than 35%.  No previous abdominal surgery reported per patient.  No anticoagulation.  He does not smoke.  No diarrhea reported.      Past Medical History:   Diagnosis Date   • Anxiety    • Arthritis     recent onset of joint stiffness   • Chronic pain disorder    • Depression    • Disease of thyroid gland    • Gallstones 9/2/2020   • GERD (gastroesophageal reflux disease)    • Mixed hyperlipidemia 10/7/2020   • Panic disorder        Family History   Problem Relation Age of Onset   • Arthritis Mother    • Cancer Mother    • Thyroid disease Mother    • Diabetes Brother    • Stroke Paternal Grandmother    • Thyroid disease Paternal Grandmother        Social History     Socioeconomic History   • Marital status:      Spouse name: nguyen   • Number of children: 2   • Years of education: 18   • Highest education level: Not on file   Occupational History   • Occupation: [employer]   Tobacco Use   • Smoking status: Never Smoker   • Smokeless tobacco: Never Used   Substance and Sexual Activity   • Alcohol use: No   • Drug use: No   • Sexual activity: Defer       Past Surgical History:   Procedure Laterality Date   • COLONOSCOPY N/A 8/24/2020    Procedure: COLONOSCOPY FOR SCREENING CPT CODE: ;  Surgeon: Bulmaro Horton MD;  Location: [facility];  Service: Gastroenterology;  Laterality: N/A;   • ENDOSCOPY N/A 8/24/2020    Procedure: ESOPHAGOGASTRODUODENOSCOPY WITH BIOPSY CPT CODE: 81747;  Surgeon: Bulmaro Horton MD;  Location: [facility] OR;  Service: Gastroenterology;  Laterality: N/A;       Review of Systems   Constitutional: Negative for activity change, appetite change, chills and fever.  "  HENT: Negative for sore throat and trouble swallowing.    Eyes: Negative for visual disturbance.   Respiratory: Negative for cough and shortness of breath.    Cardiovascular: Negative for chest pain and palpitations.   Gastrointestinal: Positive for abdominal distention. Negative for abdominal pain, blood in stool, constipation, diarrhea, nausea and vomiting.   Endocrine: Negative for cold intolerance and heat intolerance.   Genitourinary: Negative for dysuria.   Musculoskeletal: Negative for joint swelling.   Skin: Negative for color change, rash and wound.   Allergic/Immunologic: Negative for immunocompromised state.   Neurological: Negative for dizziness, seizures, weakness and headaches.   Hematological: Negative for adenopathy. Does not bruise/bleed easily.   Psychiatric/Behavioral: Negative for agitation and confusion.         /81   Pulse 67   Ht 182.9 cm (72.01\")   Wt 87.5 kg (193 lb)   BMI 26.17 kg/m²   Objective   Physical Exam  Constitutional:       Appearance: He is well-developed.   HENT:      Head: Normocephalic and atraumatic.   Eyes:      Conjunctiva/sclera: Conjunctivae normal.      Pupils: Pupils are equal, round, and reactive to light.   Neck:      Musculoskeletal: Neck supple.      Thyroid: No thyromegaly.      Vascular: No JVD.      Trachea: No tracheal deviation.   Cardiovascular:      Rate and Rhythm: Normal rate and regular rhythm.      Heart sounds: No murmur. No friction rub. No gallop.    Pulmonary:      Effort: Pulmonary effort is normal.      Breath sounds: Normal breath sounds.   Abdominal:      General: There is no distension.      Palpations: Abdomen is soft. There is no hepatomegaly or splenomegaly.      Tenderness: There is no abdominal tenderness.      Hernia: No hernia is present.   Musculoskeletal: Normal range of motion.         General: No deformity.   Skin:     General: Skin is warm and dry.   Neurological:      Mental Status: He is alert and oriented to person, " place, and time.               Assessment   Diagnoses and all orders for this visit:    1. Biliary dyskinesia (Primary)  -     Case Request; Standing  -     CBC & Differential; Future  -     Comprehensive Metabolic Panel; Future  -     ampicillin-sulbactam 3 g/100 mL 0.9% NS (MBP)  -     ondansetron (ZOFRAN) injection 4 mg  -     indocyanine green (IC-GREEN) injection 7.5 mg  -     Case Request    Other orders  -     Follow Anesthesia Guidelines / Standing Orders; Future  -     Provide NPO Instructions to Patient; Future  -     Chlorhexidine Skin Prep; Future  -     Follow Anesthesia Guidelines / Standing Orders; Standing  -     Verify NPO Status; Standing  -     Obtain Informed Consent; Standing  -     SCD (Sequential Compression Device) - Place on Patient in Pre-Op; Standing  -     Clip Operative Site; Standing  -     Verify / Perform Chlorhexidine Skin Prep; Standing  -     Verify / Perform Chlorhexidine Skin Prep if Indicated (If Not Already Completed); Standing      Barrington Chao is a 54 y.o. male with postprandial bloating and right upper quadrant abdominal discomfort.  Ejection fraction less than 35% on HIDA scan consistent with biliary dyskinesia.  Recent benefits of been discussed with the patient he will proceed with robotic cholecystectomy.    Patient's Body mass index is 26.17 kg/m². BMI is above normal parameters. Recommendations include: educational material.

## 2020-10-15 DIAGNOSIS — Z01.818 PREOP TESTING: Primary | ICD-10-CM

## 2020-10-23 ENCOUNTER — TELEPHONE (OUTPATIENT)
Dept: SURGERY | Facility: CLINIC | Age: 55
End: 2020-10-23

## 2020-10-28 ENCOUNTER — APPOINTMENT (OUTPATIENT)
Dept: PREADMISSION TESTING | Facility: HOSPITAL | Age: 55
End: 2020-10-28

## 2020-10-28 ENCOUNTER — LAB (OUTPATIENT)
Dept: LAB | Facility: HOSPITAL | Age: 55
End: 2020-10-28

## 2020-10-28 DIAGNOSIS — Z01.818 PREOP TESTING: ICD-10-CM

## 2020-11-05 ENCOUNTER — OFFICE VISIT (OUTPATIENT)
Dept: GASTROENTEROLOGY | Facility: CLINIC | Age: 55
End: 2020-11-05

## 2020-11-05 VITALS
HEART RATE: 65 BPM | TEMPERATURE: 98.7 F | HEIGHT: 72 IN | WEIGHT: 198 LBS | SYSTOLIC BLOOD PRESSURE: 140 MMHG | DIASTOLIC BLOOD PRESSURE: 84 MMHG | OXYGEN SATURATION: 98 % | BODY MASS INDEX: 26.82 KG/M2

## 2020-11-05 DIAGNOSIS — K59.04 CHRONIC IDIOPATHIC CONSTIPATION: ICD-10-CM

## 2020-11-05 DIAGNOSIS — K80.20 CALCULUS OF GALLBLADDER WITHOUT CHOLECYSTITIS WITHOUT OBSTRUCTION: ICD-10-CM

## 2020-11-05 DIAGNOSIS — R10.84 GENERALIZED ABDOMINAL PAIN: Primary | ICD-10-CM

## 2020-11-05 PROBLEM — Z12.12 ENCOUNTER FOR COLORECTAL CANCER SCREENING: Status: RESOLVED | Noted: 2020-08-17 | Resolved: 2020-11-05

## 2020-11-05 PROBLEM — Z12.11 ENCOUNTER FOR COLORECTAL CANCER SCREENING: Status: RESOLVED | Noted: 2020-08-17 | Resolved: 2020-11-05

## 2020-11-05 PROCEDURE — 99213 OFFICE O/P EST LOW 20 MIN: CPT | Performed by: PHYSICIAN ASSISTANT

## 2020-11-05 NOTE — PROGRESS NOTES
: 1965    Chief Complaint   Patient presents with   • Abdominal Pain       Barrington Chao is a 54 y.o. male who presents to the office today as a follow up appointment regarding Abdominal Pain.    History of Present Illness:  Day, he states that he has been having stools daily with Linzess 72 mcg once daily.  He points to #4 as the consistency of his stool on the [place] stool scale.  Abdominal pain is still present and constant but mild and not as frequent as previous.  He feels that he is gradually improving.  He did not try the Trulance previously prescribed because his insurance did not cover it.  MiraLAX in the past without complete relief.  Recent imaging of the abdomen showed gallstones, has general surgery appointment soon.    Review of Systems   Constitutional: Negative.    HENT: Negative for sore throat and trouble swallowing.    Eyes: Negative.    Respiratory: Negative for chest tightness.    Cardiovascular: Negative for chest pain.   Gastrointestinal: Positive for abdominal distention and abdominal pain. Negative for anal bleeding, blood in stool, constipation, diarrhea, nausea, rectal pain and vomiting.   Endocrine: Negative.    Genitourinary: Positive for difficulty urinating. Negative for hematuria.   Musculoskeletal: Positive for back pain. Negative for neck pain.   Skin: Negative.    Allergic/Immunologic: Negative for environmental allergies and food allergies.   Neurological: Negative for dizziness, seizures and headaches.   Hematological: Does not bruise/bleed easily.   Psychiatric/Behavioral: Positive for sleep disturbance.     I have reviewed and confirmed the accuracy of the ROS as documented by the MA/LPN/RN Jacquelyn Schaefer PA-C    Past Medical History:   Diagnosis Date   • Anxiety    • Arthritis     recent onset of joint stiffness   • Chronic pain disorder    • Depression    • Disease of thyroid gland    • Gallstones 2020   • GERD (gastroesophageal reflux disease)    • Mixed  hyperlipidemia 10/7/2020   • Panic disorder        Past Surgical History:   Procedure Laterality Date   • COLONOSCOPY N/A 8/24/2020    Procedure: COLONOSCOPY FOR SCREENING CPT CODE: ;  Surgeon: Bulmaro Horton MD;  Location: [facility];  Service: Gastroenterology;  Laterality: N/A;   • ENDOSCOPY N/A 8/24/2020    Procedure: ESOPHAGOGASTRODUODENOSCOPY WITH BIOPSY CPT CODE: 51089;  Surgeon: Bulmaro Horton MD;  Location: [facility] OR;  Service: Gastroenterology;  Laterality: N/A;       Family History   Problem Relation Age of Onset   • Arthritis Mother    • Cancer Mother    • Thyroid disease Mother    • Diabetes Brother    • Stroke Paternal Grandmother    • Thyroid disease Paternal Grandmother        Social History     Socioeconomic History   • Marital status:      Spouse name: nguyen   • Number of children: 2   • Years of education: 18   • Highest education level: Not on file   Occupational History   • Occupation: [employer]   Tobacco Use   • Smoking status: Never Smoker   • Smokeless tobacco: Never Used   Substance and Sexual Activity   • Alcohol use: No   • Drug use: No   • Sexual activity: Defer       Current Outpatient Medications:   •  atenolol (Tenormin) 25 MG tablet, Take 1 tablet by mouth 2 (Two) Times a Day As Needed (anxiety, do not take if heart rate less than 60)., Disp: 60 tablet, Rfl: 5  •  busPIRone (BUSPAR) 10 MG tablet, Take 0.5 tablets by mouth Every Morning AND 1 tablet Daily With Lunch AND 0.5 tablets Every Evening., Disp: 60 tablet, Rfl: 0  •  escitalopram (LEXAPRO) 20 MG tablet, Take 1 tablet by mouth Daily., Disp: 30 tablet, Rfl: 0  •  famciclovir (FAMVIR) 500 MG tablet, Take 1 tablet by mouth 2 (Two) Times a Day., Disp: 60 tablet, Rfl: 5  •  febuxostat (ULORIC) 40 MG tablet, Take 1 tablet by mouth Daily., Disp: 90 tablet, Rfl: 3  •  HYDROcodone-acetaminophen (NORCO) 7.5-325 MG per tablet, , Disp: , Rfl:   •  levothyroxine (Synthroid) 75 MCG tablet, Take  "1 tablet by mouth Daily., Disp: 30 tablet, Rfl: 5  •  linaclotide (Linzess) 72 MCG capsule capsule, Take 1 capsule by mouth Every Morning Before Breakfast. Wait 30 mins before eating., Disp: 30 capsule, Rfl: 5  •  omeprazole (priLOSEC) 40 MG capsule, Take 1 capsule by mouth 2 (Two) Times a Day Before Meals. Take a half hour before breakfast, Disp: 180 capsule, Rfl: 1  •  Plecanatide 3 MG tablet, Take 1 tablet by mouth Daily., Disp: 30 tablet, Rfl: 5  •  rosuvastatin (Crestor) 5 MG tablet, Take 1 tablet by mouth Every Night., Disp: 90 tablet, Rfl: 3  •  sildenafil (REVATIO) 20 MG tablet, Take 1 tablet by mouth Daily., Disp: 30 tablet, Rfl: 05  •  tamsulosin (Flomax) 0.4 MG capsule 24 hr capsule, Take 1 capsule by mouth Every Night., Disp: 30 capsule, Rfl: 5    Allergies:   Patient has no known allergies.    Vitals:  /84   Pulse 65   Temp 98.7 °F (37.1 °C)   Ht 182.9 cm (72\")   Wt 89.8 kg (198 lb)   SpO2 98%   BMI 26.85 kg/m²     Physical Exam  Vitals signs reviewed.   Constitutional:       General: He is not in acute distress.     Appearance: Normal appearance. He is well-developed. He is not ill-appearing or diaphoretic.   HENT:      Head: Normocephalic and atraumatic.      Right Ear: External ear normal.      Left Ear: External ear normal.      Nose: Nose normal.      Mouth/Throat:      Comments: Wearing a mask  Eyes:      General: No scleral icterus.        Right eye: No discharge.         Left eye: No discharge.      Conjunctiva/sclera: Conjunctivae normal.   Neck:      Musculoskeletal: Normal range of motion.      Vascular: No JVD.   Cardiovascular:      Rate and Rhythm: Normal rate and regular rhythm.      Heart sounds: Normal heart sounds. No murmur. No friction rub. No gallop.    Pulmonary:      Effort: Pulmonary effort is normal. No respiratory distress.      Breath sounds: Normal breath sounds. No wheezing or rales.   Chest:      Chest wall: No tenderness.   Abdominal:      General: Bowel sounds " are normal. There is no distension.      Palpations: Abdomen is soft. There is no mass.      Tenderness: There is abdominal tenderness (generalized, mild). There is no guarding.   Musculoskeletal: Normal range of motion.         General: No deformity.   Skin:     General: Skin is warm and dry.      Findings: No erythema or rash.   Neurological:      Mental Status: He is alert and oriented to person, place, and time.      Coordination: Coordination normal.   Psychiatric:         Behavior: Behavior normal.         Thought Content: Thought content normal.         Judgment: Judgment normal.      Comments: Mild anxious affect       Assessment:  1. Generalized abdominal pain    2. Chronic idiopathic constipation    3. Calculus of gallbladder without cholecystitis without obstruction      Plan:  He will continue taking Linzess but recommended to increase dose to 145 mcg once daily for treatment of constipation.  He will monitor abdominal pain for resolution after this increased dose.  Keep general surgery appointment for treatment of gallstones.    New Medications Ordered This Visit   Medications   • linaclotide (LINZESS) 145 MCG capsule capsule     Sig: Take 1 capsule by mouth Every Morning Before Breakfast. Wait 30 mins before eating.     Dispense:  30 capsule     Refill:  5           Return in about 3 months (around 2/5/2021) for recheck constipation.      Electronically signed 11/9/2020 14:59 OLAYINKA Schaefer PA-C  [place]

## 2020-11-13 ENCOUNTER — TELEPHONE (OUTPATIENT)
Dept: SURGERY | Facility: CLINIC | Age: 55
End: 2020-11-13

## 2020-11-19 ENCOUNTER — PROCEDURE VISIT (OUTPATIENT)
Dept: FAMILY MEDICINE CLINIC | Facility: CLINIC | Age: 55
End: 2020-11-19

## 2020-11-19 VITALS
WEIGHT: 195 LBS | OXYGEN SATURATION: 92 % | TEMPERATURE: 97.1 F | HEIGHT: 72 IN | SYSTOLIC BLOOD PRESSURE: 120 MMHG | BODY MASS INDEX: 26.41 KG/M2 | HEART RATE: 63 BPM | DIASTOLIC BLOOD PRESSURE: 80 MMHG

## 2020-11-19 DIAGNOSIS — K59.04 CHRONIC IDIOPATHIC CONSTIPATION: ICD-10-CM

## 2020-11-19 DIAGNOSIS — E78.2 MIXED HYPERLIPIDEMIA: ICD-10-CM

## 2020-11-19 DIAGNOSIS — F39 UNSPECIFIED MOOD (AFFECTIVE) DISORDER (HCC): ICD-10-CM

## 2020-11-19 DIAGNOSIS — M10.00 IDIOPATHIC GOUT, UNSPECIFIED CHRONICITY, UNSPECIFIED SITE: ICD-10-CM

## 2020-11-19 DIAGNOSIS — F41.9 ANXIETY: ICD-10-CM

## 2020-11-19 DIAGNOSIS — E03.9 ACQUIRED HYPOTHYROIDISM: ICD-10-CM

## 2020-11-19 DIAGNOSIS — N52.9 ERECTILE DYSFUNCTION, UNSPECIFIED ERECTILE DYSFUNCTION TYPE: ICD-10-CM

## 2020-11-19 DIAGNOSIS — N39.43 BENIGN PROSTATIC HYPERPLASIA WITH POST-VOID DRIBBLING: ICD-10-CM

## 2020-11-19 DIAGNOSIS — F41.1 GENERALIZED ANXIETY DISORDER: ICD-10-CM

## 2020-11-19 DIAGNOSIS — F41.0 PANIC DISORDER: ICD-10-CM

## 2020-11-19 DIAGNOSIS — B00.1 COLD SORE: ICD-10-CM

## 2020-11-19 DIAGNOSIS — N40.1 BENIGN PROSTATIC HYPERPLASIA WITH POST-VOID DRIBBLING: ICD-10-CM

## 2020-11-19 PROCEDURE — 99214 OFFICE O/P EST MOD 30 MIN: CPT | Performed by: NURSE PRACTITIONER

## 2020-11-19 RX ORDER — OMEPRAZOLE 40 MG/1
40 CAPSULE, DELAYED RELEASE ORAL
Qty: 180 CAPSULE | Refills: 1 | Status: SHIPPED | OUTPATIENT
Start: 2020-11-19 | End: 2021-04-08 | Stop reason: SDUPTHER

## 2020-11-19 RX ORDER — FEBUXOSTAT 40 MG/1
40 TABLET, FILM COATED ORAL DAILY
Qty: 90 TABLET | Refills: 3 | Status: SHIPPED | OUTPATIENT
Start: 2020-11-19 | End: 2021-04-08 | Stop reason: SDUPTHER

## 2020-11-19 RX ORDER — FAMCICLOVIR 500 MG/1
500 TABLET ORAL 2 TIMES DAILY
Qty: 180 TABLET | Refills: 3 | Status: SHIPPED | OUTPATIENT
Start: 2020-11-19 | End: 2021-04-08 | Stop reason: SDUPTHER

## 2020-11-19 RX ORDER — BUSPIRONE HYDROCHLORIDE 10 MG/1
TABLET ORAL
Qty: 180 TABLET | Refills: 3 | Status: SHIPPED | OUTPATIENT
Start: 2020-11-19 | End: 2021-03-11 | Stop reason: SDUPTHER

## 2020-11-19 RX ORDER — LEVOTHYROXINE SODIUM 0.07 MG/1
75 TABLET ORAL DAILY
Qty: 90 TABLET | Refills: 3 | Status: SHIPPED | OUTPATIENT
Start: 2020-11-19 | End: 2021-04-08 | Stop reason: SDUPTHER

## 2020-11-19 RX ORDER — TAMSULOSIN HYDROCHLORIDE 0.4 MG/1
1 CAPSULE ORAL NIGHTLY
Qty: 90 CAPSULE | Refills: 3 | Status: SHIPPED | OUTPATIENT
Start: 2020-11-19 | End: 2021-04-08 | Stop reason: SDUPTHER

## 2020-11-19 RX ORDER — ESCITALOPRAM OXALATE 20 MG/1
20 TABLET ORAL DAILY
Qty: 90 TABLET | Refills: 3 | Status: SHIPPED | OUTPATIENT
Start: 2020-11-19 | End: 2021-03-11 | Stop reason: SDUPTHER

## 2020-11-19 RX ORDER — SILDENAFIL CITRATE 20 MG/1
20 TABLET ORAL DAILY
Qty: 90 TABLET | Refills: 3 | Status: SHIPPED | OUTPATIENT
Start: 2020-11-19 | End: 2021-04-08 | Stop reason: SDUPTHER

## 2020-11-19 RX ORDER — ROSUVASTATIN CALCIUM 5 MG/1
5 TABLET, COATED ORAL NIGHTLY
Qty: 90 TABLET | Refills: 3 | Status: SHIPPED | OUTPATIENT
Start: 2020-11-19 | End: 2021-04-08 | Stop reason: SDUPTHER

## 2020-11-19 RX ORDER — ATENOLOL 25 MG/1
25 TABLET ORAL 2 TIMES DAILY PRN
Qty: 180 TABLET | Refills: 3 | Status: SHIPPED | OUTPATIENT
Start: 2020-11-19 | End: 2021-04-08 | Stop reason: SDUPTHER

## 2020-11-19 NOTE — PROGRESS NOTES
Subjective   Barrington Chao is a 54 y.o. male.     No chief complaint on file.    Chief complaint  Medication refills  Talk about recent consults    History of Present Illness:    Does not want cryotherapy as the spot has resolved.     Patient has been for GI consult since his last visit.  He has been diagnosed with biliary dyskinesia.  He was rescheduled for a gallbladder removal tomorrow but this is been scheduled due to provider emergency.  Patient states that he still has right upper quad pain that is worse after eating heavy or greasy foods.  Plans to get his gallbladder out as soon as that is able to be scheduled.    Benign prostatic hyperplasia with post void dribbling and renal cyst-under the care of urology.  Test results were sent to nephrology for consult.  Local nephrologist has declined to see the patient stating that urology will be appropriate to handle his cyst and problems.  Explained this to patient today.    GERD-improving with treatment.    Mixed hyperlipidemia-working on a heart healthy diet.  Taking Crestor.  No side effects.    Anxiety-patient did consult psychiatric nurse practitioner last month.  He is asking today for clonazepam.  Patient was recently started on BuSpar which she states is helping.  He also takes a beta-blocker which helps with anxiety.  Takes Lexapro for anxiety/depression.  Denies thoughts of hurting self or others.  Does state his symptoms are improved.  I will declined his request for clonazepam today and let him follow-up with behavioral health.    Arthritis-occasional aches and pains.  Worse with activity.  Treated with over-the-counter medications and conservative treatment.  Has gout-taking Uloric.  No side effects.    Elevated liver enzymes-under the care of GI.  Tries to avoid Tylenol alcohol.  Has recently started on Linzess per GI for constipation.    Overweight-patient is working on heart healthy diet.    Hypothyroidism-stable with Synthroid.      The following  "portions of the patient's history and ROS were reviewed and updated as appropriate per provider:  Allergies, current medications, past family history, past medical history, past social history, past surgical history and problem list.    Review of Systems   Constitutional: Negative for activity change, appetite change, chills, fatigue and fever.   HENT: Negative for congestion, ear pain, rhinorrhea, sinus pressure, sinus pain and sore throat.    Eyes: Negative.    Respiratory: Negative for chest tightness, shortness of breath and wheezing.    Cardiovascular: Negative.    Gastrointestinal: Positive for abdominal pain, constipation and nausea. Negative for blood in stool, diarrhea and vomiting.   Endocrine: Negative.    Genitourinary: Negative for dysuria, flank pain and genital sores.   Musculoskeletal: Positive for arthralgias and back pain. Negative for neck pain and neck stiffness.   Skin: Negative.    Allergic/Immunologic: Negative.    Neurological: Negative for dizziness, facial asymmetry and numbness.   Hematological: Negative.    Psychiatric/Behavioral: Negative for agitation, behavioral problems, dysphoric mood, self-injury and suicidal ideas. The patient is nervous/anxious.        Objective     /80   Pulse 63   Temp 97.1 °F (36.2 °C) (Tympanic)   Ht 182.9 cm (72\")   Wt 88.5 kg (195 lb)   SpO2 92%   BMI 26.45 kg/m²   Admission on 08/24/2020, Discharged on 08/24/2020   Component Date Value Ref Range Status   • Case Report 08/24/2020    Final                    Value:Surgical Pathology Report                         Case: [id number]                                  Authorizing Provider:  Bulmaro Horton     Collected:           08/24/2020 04:18 PM                                 MD Jose Luis                                                                    Ordering Location:     [facility]      Received:            08/24/2020 04:56 PM                                 OPERATING ROOM " DEPARTMENT                                                    Pathologist:           Cayla Michel DO                                                        Specimens:   1) - Small Intestine, Duodenum BX                                                                   2) - Gastric, Antrum, Antrum BX                                                                     3) - Esophagus, Esophagus BX                                                              • Final Diagnosis 08/24/2020    Final                    Value:This result contains rich text formatting which cannot be displayed here.       Physical Exam  Vitals signs reviewed.   Constitutional:       General: He is not in acute distress.     Appearance: Normal appearance. He is well-developed, well-groomed and overweight. He is not ill-appearing, toxic-appearing or diaphoretic.      Comments: Very talkative 54-year-old male   HENT:      Head: Normocephalic and atraumatic. Hair is normal.      Right Ear: Tympanic membrane, ear canal and external ear normal.      Left Ear: Tympanic membrane, ear canal and external ear normal.      Nose: Nose normal.      Right Sinus: No maxillary sinus tenderness or frontal sinus tenderness.      Left Sinus: No maxillary sinus tenderness or frontal sinus tenderness.      Mouth/Throat:      Lips: Pink. No lesions.      Pharynx: No oropharyngeal exudate.   Eyes:      General: Lids are normal. Vision grossly intact. Gaze aligned appropriately.         Right eye: No discharge.         Left eye: No discharge.      Conjunctiva/sclera: Conjunctivae normal.      Pupils: Pupils are equal, round, and reactive to light.   Neck:      Musculoskeletal: Normal range of motion and neck supple. Normal range of motion. No spinous process tenderness or muscular tenderness.      Thyroid: No thyromegaly.      Trachea: No tracheal deviation.   Cardiovascular:      Rate and Rhythm: Normal rate and regular rhythm.      Pulses: Normal pulses.       Heart sounds: Normal heart sounds. No murmur. No friction rub. No gallop.    Pulmonary:      Effort: Pulmonary effort is normal. No accessory muscle usage or respiratory distress.      Breath sounds: Normal breath sounds. No wheezing or rales.   Chest:      Chest wall: No tenderness.   Abdominal:      General: Bowel sounds are normal. There is no distension.      Palpations: Abdomen is soft. There is no mass.      Tenderness: There is no abdominal tenderness. There is no guarding or rebound.   Musculoskeletal: Normal range of motion.   Lymphadenopathy:      Cervical: No cervical adenopathy.   Skin:     General: Skin is warm and dry.      Capillary Refill: Capillary refill takes less than 2 seconds.      Coloration: Skin is not cyanotic or pale.      Findings: No erythema or rash.      Nails: There is no clubbing.     Neurological:      General: No focal deficit present.      Mental Status: He is alert and oriented to person, place, and time.      Deep Tendon Reflexes: Reflexes are normal and symmetric.      Comments: CN 2-12 grossly intact    Psychiatric:         Attention and Perception: Attention normal.         Mood and Affect: Mood normal.         Speech: Speech normal.         Behavior: Behavior normal. Behavior is cooperative.         Thought Content: Thought content normal.         Cognition and Memory: Cognition normal.         Judgment: Judgment normal.         Assessment/Plan     Problem List Items Addressed This Visit        Cardiovascular and Mediastinum    Mixed hyperlipidemia    Relevant Medications    rosuvastatin (Crestor) 5 MG tablet    Other Relevant Orders    CBC & Differential    Comprehensive Metabolic Panel    TSH    Hemoglobin A1c    Vitamin D 25 Hydroxy    Uric Acid    MicroAlbumin, Urine, Random - Urine, Clean Catch    Lipid Panel    Vitamin B12       Endocrine    Acquired hypothyroidism    Relevant Medications    levothyroxine (Synthroid) 75 MCG tablet    atenolol (Tenormin) 25 MG tablet     Other Relevant Orders    CBC & Differential    Comprehensive Metabolic Panel    TSH    Hemoglobin A1c    Vitamin D 25 Hydroxy    Uric Acid    MicroAlbumin, Urine, Random - Urine, Clean Catch    Lipid Panel    Vitamin B12       Genitourinary    Benign prostatic hyperplasia with post-void dribbling    Relevant Medications    tamsulosin (Flomax) 0.4 MG capsule 24 hr capsule    Other Relevant Orders    CBC & Differential    Comprehensive Metabolic Panel    TSH    Hemoglobin A1c    Vitamin D 25 Hydroxy    Uric Acid    MicroAlbumin, Urine, Random - Urine, Clean Catch    Lipid Panel    Vitamin B12       Other    Anxiety    Relevant Medications    atenolol (Tenormin) 25 MG tablet    Other Relevant Orders    CBC & Differential    Comprehensive Metabolic Panel    TSH    Hemoglobin A1c    Vitamin D 25 Hydroxy    Uric Acid    MicroAlbumin, Urine, Random - Urine, Clean Catch    Lipid Panel    Vitamin B12      Other Visit Diagnoses     Chronic idiopathic constipation        Relevant Medications    linaclotide (LINZESS) 145 MCG capsule capsule    Other Relevant Orders    CBC & Differential    Comprehensive Metabolic Panel    TSH    Hemoglobin A1c    Vitamin D 25 Hydroxy    Uric Acid    MicroAlbumin, Urine, Random - Urine, Clean Catch    Lipid Panel    Vitamin B12    Panic disorder        Relevant Medications    escitalopram (LEXAPRO) 20 MG tablet    busPIRone (BUSPAR) 10 MG tablet    Other Relevant Orders    CBC & Differential    Comprehensive Metabolic Panel    TSH    Hemoglobin A1c    Vitamin D 25 Hydroxy    Uric Acid    MicroAlbumin, Urine, Random - Urine, Clean Catch    Lipid Panel    Vitamin B12    Generalized anxiety disorder        Relevant Medications    escitalopram (LEXAPRO) 20 MG tablet    busPIRone (BUSPAR) 10 MG tablet    Other Relevant Orders    CBC & Differential    Comprehensive Metabolic Panel    TSH    Hemoglobin A1c    Vitamin D 25 Hydroxy    Uric Acid    MicroAlbumin, Urine, Random - Urine, Clean Catch    Lipid  Panel    Vitamin B12    Unspecified mood (affective) disorder (CMS/[facility], located within [facility])        Relevant Medications    escitalopram (LEXAPRO) 20 MG tablet    busPIRone (BUSPAR) 10 MG tablet    Other Relevant Orders    CBC & Differential    Comprehensive Metabolic Panel    TSH    Hemoglobin A1c    Vitamin D 25 Hydroxy    Uric Acid    MicroAlbumin, Urine, Random - Urine, Clean Catch    Lipid Panel    Vitamin B12    Cold sore        Positive herpes simplex 1 and 2    Relevant Medications    famciclovir (FAMVIR) 500 MG tablet    Other Relevant Orders    CBC & Differential    Comprehensive Metabolic Panel    TSH    Hemoglobin A1c    Vitamin D 25 Hydroxy    Uric Acid    MicroAlbumin, Urine, Random - Urine, Clean Catch    Lipid Panel    Vitamin B12    Idiopathic gout, unspecified chronicity, unspecified site        Relevant Medications    febuxostat (ULORIC) 40 MG tablet    Other Relevant Orders    CBC & Differential    Comprehensive Metabolic Panel    TSH    Hemoglobin A1c    Vitamin D 25 Hydroxy    Uric Acid    MicroAlbumin, Urine, Random - Urine, Clean Catch    Lipid Panel    Vitamin B12    Erectile dysfunction, unspecified erectile dysfunction type        Relevant Medications    sildenafil (REVATIO) 20 MG tablet    Other Relevant Orders    CBC & Differential    Comprehensive Metabolic Panel    TSH    Hemoglobin A1c    Vitamin D 25 Hydroxy    Uric Acid    MicroAlbumin, Urine, Random - Urine, Clean Catch    Lipid Panel    Vitamin B12          I have reviewed medication list and discussed with patient the possible side effects and interactions.  We have discussed purpose for medication, route, dosage, frequency.  Refill routine medications.    Refill routine medications for 90-day supply as patient is losing his insurance soon and this will be helpful financially.    Reviewed recent labs.    Declined request for benzodiazepine.  Refill BuSpar and Lexapro during his behavioral health provider's maternity leave.  Emotional support  provided.  Denies thoughts of hurting self or others.    Coronavirus precautions have been reviewed and discussed.  I have discussed the CDC recommendations  of social distancing, hand washing and disinfecting commonly touched items. Reviewed need to notify PCP and self quarantine with mild symptoms.  Discussed procedure to obtain Covid-19 testing and notification of PCP/health dept/ED/Urgent Center if symptoms begin. Understanding verbalized.    Reviewed recent GI consult and discussed with patient.    Reviewed recent surgical consult and reviewed with patient, encouraged him to reschedule his gallbladder surgery as soon as provider is available.  Avoid greasy and fatty foods.         Patient's Body mass index is 26.45 kg/m². BMI is above normal parameters. Recommendations include: exercise counseling and nutrition counseling.        I have discussed diagnosis in detail today allowing time for questions and answers. Patient is aware of reasons to seek urgent or emergent medical care as well as reasons to return to the clinic for evaluation. Possible side effects, interactions and progression of symptoms discussed as well. Patient / family states understanding.   Emotional support and active listening provided.       Follow up in 3-6 months. Routine labs fasting one week prior to next office visit. Return sooner if needed.             This document has been electronically signed by:  DIPTI Jiménez, NP-C

## 2021-01-05 ENCOUNTER — DOCUMENTATION (OUTPATIENT)
Dept: FAMILY MEDICINE CLINIC | Facility: CLINIC | Age: 56
End: 2021-01-05

## 2021-01-05 RX ORDER — AZITHROMYCIN 250 MG/1
TABLET, FILM COATED ORAL
Qty: 6 TABLET | Refills: 0 | Status: SHIPPED | OUTPATIENT
Start: 2021-01-05 | End: 2021-04-08

## 2021-01-05 RX ORDER — MULTIPLE VITAMINS W/ MINERALS TAB 9MG-400MCG
1 TAB ORAL DAILY
Qty: 30 TABLET | Refills: 5 | Status: SHIPPED | OUTPATIENT
Start: 2021-01-05 | End: 2021-02-04

## 2021-01-25 ENCOUNTER — TELEPHONE (OUTPATIENT)
Dept: FAMILY MEDICINE CLINIC | Facility: CLINIC | Age: 56
End: 2021-01-25

## 2021-03-05 ENCOUNTER — CLINICAL SUPPORT (OUTPATIENT)
Dept: FAMILY MEDICINE CLINIC | Facility: CLINIC | Age: 56
End: 2021-03-05

## 2021-03-05 DIAGNOSIS — Z23 ENCOUNTER FOR IMMUNIZATION: Primary | ICD-10-CM

## 2021-03-05 PROCEDURE — 90471 IMMUNIZATION ADMIN: CPT | Performed by: NURSE PRACTITIONER

## 2021-03-05 PROCEDURE — 90636 HEP A/HEP B VACC ADULT IM: CPT | Performed by: NURSE PRACTITIONER

## 2021-03-11 ENCOUNTER — OFFICE VISIT (OUTPATIENT)
Dept: PSYCHIATRY | Facility: CLINIC | Age: 56
End: 2021-03-11

## 2021-03-11 VITALS
BODY MASS INDEX: 26.5 KG/M2 | DIASTOLIC BLOOD PRESSURE: 80 MMHG | WEIGHT: 195.4 LBS | SYSTOLIC BLOOD PRESSURE: 142 MMHG | HEART RATE: 59 BPM

## 2021-03-11 DIAGNOSIS — F39 UNSPECIFIED MOOD (AFFECTIVE) DISORDER (HCC): ICD-10-CM

## 2021-03-11 DIAGNOSIS — F41.1 GENERALIZED ANXIETY DISORDER: ICD-10-CM

## 2021-03-11 DIAGNOSIS — F41.0 PANIC DISORDER: ICD-10-CM

## 2021-03-11 PROCEDURE — 99213 OFFICE O/P EST LOW 20 MIN: CPT | Performed by: NURSE PRACTITIONER

## 2021-03-11 RX ORDER — BUSPIRONE HYDROCHLORIDE 10 MG/1
TABLET ORAL
Qty: 90 TABLET | Refills: 2 | Status: SHIPPED | OUTPATIENT
Start: 2021-03-11 | End: 2021-04-08 | Stop reason: SDUPTHER

## 2021-03-11 RX ORDER — ESCITALOPRAM OXALATE 20 MG/1
20 TABLET ORAL DAILY
Qty: 90 TABLET | Refills: 3
Start: 2021-03-11 | End: 2021-04-08 | Stop reason: SDUPTHER

## 2021-03-11 NOTE — PROGRESS NOTES
Subjective   Barrington Chao is a 55 y.o. male is here today for medication management follow-up.    Chief Complaint: anxiety panic attacks    History of Present Illness:    Patient presents today for follow up for medication management for anxiety and panic attacks. Patient reports currently depression is at a 7-8 on a 0-10 scale with 10 being the worst. Patient reports symptoms of depression of low energy and low mood. Patient states panic attacks are main concern. Patient reports anxiety is at a 9 on a 0-10 scale with 10 being the worst. Patient reports having 1-2 panic attacks a week. Patient reports panic attacks last 30-60 minutes and during an attacks patient has shortness of breath, things spinning, and heart racing. Patient states constantly worrying about having an attack. Patient reports sleeping 7 hours a night and patient denies any nightmares. Patient reports appetite is good and eating 2-3 meals a day. Patient denies any auditory or visual hallucinations. Patient adamantly denies any SI or HI. Patient denies any side effects to medications. Patient denies any medical changes since last visit.   The following portions of the patient's history were reviewed and updated as appropriate: allergies, current medications, past family history, past medical history, past social history, past surgical history and problem list.    Review of Systems   Constitutional: Negative.    Respiratory: Negative.    Cardiovascular: Negative.    Gastrointestinal: Negative.    Neurological: Negative.    Psychiatric/Behavioral: The patient is nervous/anxious.        Objective   Physical Exam  Vitals reviewed.   Constitutional:       Appearance: Normal appearance. He is well-developed and well-groomed.   Neurological:      Mental Status: He is alert.   Psychiatric:         Attention and Perception: Attention and perception normal.         Mood and Affect: Mood is anxious. Affect is blunt.         Speech: Speech normal.          Behavior: Behavior is slowed. Behavior is cooperative.         Thought Content: Thought content normal.         Cognition and Memory: Cognition and memory normal.         Judgment: Judgment normal.       Blood pressure 142/80, pulse 59, weight 88.6 kg (195 lb 6.4 oz). Body mass index is 26.5 kg/m².    No Known Allergies  Medication List:   Current Outpatient Medications   Medication Sig Dispense Refill   • atenolol (Tenormin) 25 MG tablet Take 1 tablet by mouth 2 (Two) Times a Day As Needed (anxiety, do not take if heart rate less than 60). 180 tablet 3   • azithromycin (Zithromax Z-Sam) 250 MG tablet Take 2 tablets the first day, then 1 tablet daily for 4 days. 6 tablet 0   • busPIRone (BUSPAR) 10 MG tablet Take 0.5 tablets by mouth Every Morning AND 1 tablet Daily With Lunch AND 0.5 tablets Every Evening. 180 tablet 3   • escitalopram (LEXAPRO) 20 MG tablet Take 1 tablet by mouth Daily. 90 tablet 3   • famciclovir (FAMVIR) 500 MG tablet Take 1 tablet by mouth 2 (Two) Times a Day. 180 tablet 3   • febuxostat (ULORIC) 40 MG tablet Take 1 tablet by mouth Daily. 90 tablet 3   • HYDROcodone-acetaminophen (NORCO) 7.5-325 MG per tablet      • levothyroxine (Synthroid) 75 MCG tablet Take 1 tablet by mouth Daily. 90 tablet 3   • linaclotide (LINZESS) 145 MCG capsule capsule Take 1 capsule by mouth Every Morning Before Breakfast. Wait 30 mins before eating. 90 capsule 3   • omeprazole (priLOSEC) 40 MG capsule Take 1 capsule by mouth 2 (Two) Times a Day Before Meals. Take a half hour before breakfast 180 capsule 1   • rosuvastatin (Crestor) 5 MG tablet Take 1 tablet by mouth Every Night. 90 tablet 3   • sildenafil (REVATIO) 20 MG tablet Take 1 tablet by mouth Daily. 90 tablet 3   • tamsulosin (Flomax) 0.4 MG capsule 24 hr capsule Take 1 capsule by mouth Every Night. 90 capsule 3     No current facility-administered medications for this visit.       Mental Status Exam:   Hygiene:   good  Cooperation:  Cooperative  Eye  Contact:  Good  Psychomotor Behavior:  Slow  Affect:  Blunted  Hopelessness: Denies  Speech:  Normal  Thought Process:  Goal directed and Linear  Thought Content:  Normal  Suicidal:  None  Homicidal:  None  Hallucinations:  None  Delusion:  None  Memory:  Intact  Orientation:  Person, Place, Time and Situation  Reliability:  fair  Insight:  Poor  Judgement:  Fair  Impulse Control:  Fair  Physical/Medical Issues:  No               Assessment/Plan   Diagnoses and all orders for this visit:    1. Panic disorder  -     escitalopram (LEXAPRO) 20 MG tablet; Take 1 tablet by mouth Daily.  Dispense: 90 tablet; Refill: 3  -     busPIRone (BUSPAR) 10 MG tablet; Take 1 tablet by mouth Every Morning AND 1 tablet Daily With Lunch AND 1 tablet Every Evening.  Dispense: 90 tablet; Refill: 2    2. Generalized anxiety disorder  -     escitalopram (LEXAPRO) 20 MG tablet; Take 1 tablet by mouth Daily.  Dispense: 90 tablet; Refill: 3  -     busPIRone (BUSPAR) 10 MG tablet; Take 1 tablet by mouth Every Morning AND 1 tablet Daily With Lunch AND 1 tablet Every Evening.  Dispense: 90 tablet; Refill: 2    3. Unspecified mood (affective) disorder (CMS/HCC)  -     escitalopram (LEXAPRO) 20 MG tablet; Take 1 tablet by mouth Daily.  Dispense: 90 tablet; Refill: 3            Discussed medication options with patient.  Increase BuSpar to 10 mg 1 tablet in the morning, 1 tablet with lunch, and 1 tablet in the evening for anxiety.  Continue Lexapro 20 mg daily for depression and anxiety.  Reviewed the risks, benefits, and side effects of the medications; patient acknowledged and verbally consented.  Patient is agreeable to call the office with any questions, concerns, or worsening of symptoms. Patient is aware to call 911 or go to the nearest ER should begin having SI/HI.        Follow-up in 4 weeks      Errors in dictation may reflect use of voice recognition software and not all errors in transcription may have been detected prior to  signing.              This document has been electronically signed by DIPTI Perea   March 11, 2021 08:19 EST

## 2021-04-08 ENCOUNTER — OFFICE VISIT (OUTPATIENT)
Dept: PSYCHIATRY | Facility: CLINIC | Age: 56
End: 2021-04-08

## 2021-04-08 VITALS
WEIGHT: 197.4 LBS | BODY MASS INDEX: 26.77 KG/M2 | DIASTOLIC BLOOD PRESSURE: 84 MMHG | SYSTOLIC BLOOD PRESSURE: 136 MMHG | HEART RATE: 82 BPM

## 2021-04-08 DIAGNOSIS — F39 UNSPECIFIED MOOD (AFFECTIVE) DISORDER (HCC): ICD-10-CM

## 2021-04-08 DIAGNOSIS — N40.1 BENIGN PROSTATIC HYPERPLASIA WITH POST-VOID DRIBBLING: ICD-10-CM

## 2021-04-08 DIAGNOSIS — F41.0 PANIC DISORDER: ICD-10-CM

## 2021-04-08 DIAGNOSIS — E78.2 MIXED HYPERLIPIDEMIA: ICD-10-CM

## 2021-04-08 DIAGNOSIS — E03.9 ACQUIRED HYPOTHYROIDISM: ICD-10-CM

## 2021-04-08 DIAGNOSIS — F41.9 ANXIETY: ICD-10-CM

## 2021-04-08 DIAGNOSIS — F41.1 GENERALIZED ANXIETY DISORDER: ICD-10-CM

## 2021-04-08 DIAGNOSIS — M10.00 IDIOPATHIC GOUT, UNSPECIFIED CHRONICITY, UNSPECIFIED SITE: ICD-10-CM

## 2021-04-08 DIAGNOSIS — B00.1 COLD SORE: ICD-10-CM

## 2021-04-08 DIAGNOSIS — N52.9 ERECTILE DYSFUNCTION, UNSPECIFIED ERECTILE DYSFUNCTION TYPE: ICD-10-CM

## 2021-04-08 DIAGNOSIS — K59.04 CHRONIC IDIOPATHIC CONSTIPATION: ICD-10-CM

## 2021-04-08 DIAGNOSIS — N39.43 BENIGN PROSTATIC HYPERPLASIA WITH POST-VOID DRIBBLING: ICD-10-CM

## 2021-04-08 PROCEDURE — 99213 OFFICE O/P EST LOW 20 MIN: CPT | Performed by: NURSE PRACTITIONER

## 2021-04-08 RX ORDER — ESCITALOPRAM OXALATE 20 MG/1
20 TABLET ORAL DAILY
Qty: 90 TABLET | Refills: 3 | Status: CANCELLED
Start: 2021-04-08

## 2021-04-08 RX ORDER — BUSPIRONE HYDROCHLORIDE 10 MG/1
TABLET ORAL
Qty: 90 TABLET | Refills: 2 | Status: CANCELLED | OUTPATIENT
Start: 2021-04-08

## 2021-04-08 RX ORDER — BUSPIRONE HYDROCHLORIDE 10 MG/1
TABLET ORAL
Qty: 105 TABLET | Refills: 1 | Status: SHIPPED | OUTPATIENT
Start: 2021-04-08 | End: 2021-05-07 | Stop reason: SDUPTHER

## 2021-04-08 RX ORDER — ESCITALOPRAM OXALATE 20 MG/1
20 TABLET ORAL DAILY
Qty: 90 TABLET | Refills: 1 | Status: SHIPPED | OUTPATIENT
Start: 2021-04-08 | End: 2021-05-07 | Stop reason: SDUPTHER

## 2021-04-08 NOTE — PROGRESS NOTES
Subjective   Barrington Chao is a 55 y.o. male is here today for medication management follow-up.    Chief Complaint:  Anxiety     History of Present Illness:  Patient presents today for follow up for medication management for anxiety. Patient states still having frequent panic attacks about three times a week. Patient states lasting about 30 mins to hour. Patient states can last up to two hours before goes away. Patient states during the panic attack feel like can't breathe, dizzy, numb, tingling, and feel like going to die. Patient denies any depression or sadness. Patient reports anxiety is at a 9 on a 0-10 scale with 10 being the worst. Patient reports sleeping 7-8 hours a night and patient denies any nightmares. Patient reports appetite is good and eating 2 meals a day. Patient denies any nausea. Patient denies any auditory or visual hallucinations. Patient adamantly denies any SI or HI. Patient denies any side effects to medications. Patient denies any medical changes since last visit.   The following portions of the patient's history were reviewed and updated as appropriate: allergies, current medications, past family history, past medical history, past social history, past surgical history and problem list.    Review of Systems   Constitutional: Negative.    Respiratory: Negative.    Cardiovascular: Negative.    Gastrointestinal: Negative.    Neurological: Negative.    Psychiatric/Behavioral: The patient is nervous/anxious.        Objective   Physical Exam  Vitals reviewed.   Constitutional:       Appearance: Normal appearance. He is well-developed and well-groomed.   Neurological:      Mental Status: He is alert.   Psychiatric:         Attention and Perception: Attention and perception normal.         Mood and Affect: Mood is anxious. Affect is flat.         Speech: Speech normal.         Behavior: Behavior normal. Behavior is cooperative.         Thought Content: Thought content normal.          Cognition and Memory: Cognition and memory normal.         Judgment: Judgment normal.       Blood pressure 136/84, pulse 82, weight 89.5 kg (197 lb 6.4 oz). Body mass index is 26.77 kg/m².    No Known Allergies  Medication List:   Current Outpatient Medications   Medication Sig Dispense Refill   • atenolol (Tenormin) 25 MG tablet Take 1 tablet by mouth 2 (Two) Times a Day As Needed (anxiety, do not take if heart rate less than 60). 180 tablet 3   • busPIRone (BUSPAR) 10 MG tablet Take 1 tablet by mouth Every Morning AND 1 tablet Daily With Lunch AND 1 tablet Every Evening. 90 tablet 2   • escitalopram (LEXAPRO) 20 MG tablet Take 1 tablet by mouth Daily. 90 tablet 3   • famciclovir (FAMVIR) 500 MG tablet Take 1 tablet by mouth 2 (Two) Times a Day. 180 tablet 3   • febuxostat (ULORIC) 40 MG tablet Take 1 tablet by mouth Daily. 90 tablet 3   • HYDROcodone-acetaminophen (NORCO) 7.5-325 MG per tablet      • levothyroxine (Synthroid) 75 MCG tablet Take 1 tablet by mouth Daily. 90 tablet 3   • linaclotide (LINZESS) 145 MCG capsule capsule Take 1 capsule by mouth Every Morning Before Breakfast. Wait 30 mins before eating. 90 capsule 3   • omeprazole (priLOSEC) 40 MG capsule Take 1 capsule by mouth 2 (Two) Times a Day Before Meals. Take a half hour before breakfast 180 capsule 1   • rosuvastatin (Crestor) 5 MG tablet Take 1 tablet by mouth Every Night. 90 tablet 3   • sildenafil (REVATIO) 20 MG tablet Take 1 tablet by mouth Daily. 90 tablet 3   • tamsulosin (Flomax) 0.4 MG capsule 24 hr capsule Take 1 capsule by mouth Every Night. 90 capsule 3     No current facility-administered medications for this visit.       Mental Status Exam:   Hygiene:   good  Cooperation:  Cooperative  Eye Contact:  Good  Psychomotor Behavior:  Appropriate  Affect: Flat  Hopelessness: Denies  Speech:  Normal  Thought Process:  Goal directed and Linear  Thought Content:  Normal  Suicidal:  None  Homicidal:  None  Hallucinations:  None  Delusion:   None  Memory:  Intact  Orientation:  Person, Place, Time and Situation  Reliability:  fair  Insight:  Fair  Judgement:  Fair  Impulse Control:  Fair  Physical/Medical Issues:  No                 Assessment/Plan   Diagnoses and all orders for this visit:    1. Panic disorder  -     busPIRone (BUSPAR) 10 MG tablet; Take 1.5 tablets by mouth Every Morning AND 1 tablet Daily With Lunch AND 1 tablet Every Evening.  Dispense: 105 tablet; Refill: 1  -     escitalopram (LEXAPRO) 20 MG tablet; Take 1 tablet by mouth Daily.  Dispense: 90 tablet; Refill: 1    2. Generalized anxiety disorder  -     busPIRone (BUSPAR) 10 MG tablet; Take 1.5 tablets by mouth Every Morning AND 1 tablet Daily With Lunch AND 1 tablet Every Evening.  Dispense: 105 tablet; Refill: 1  -     escitalopram (LEXAPRO) 20 MG tablet; Take 1 tablet by mouth Daily.  Dispense: 90 tablet; Refill: 1    3. Unspecified mood (affective) disorder (CMS/HCC)  -     escitalopram (LEXAPRO) 20 MG tablet; Take 1 tablet by mouth Daily.  Dispense: 90 tablet; Refill: 1            Discussed medication options with patient. Increase Buspar to 10mg 1.5tab in the morning, 1 tab with lunch, and 1 tab in the evening for worsening anxiety. Cont. Lexapro 20mg daily for depression, anxiety, and panic. Reviewed the risks, benefits, and side effects of the medications; patient acknowledged and verbally consented.  Patient is agreeable to call the office with any questions, concerns, or worsening of symptoms. Patient is aware to call 911 or go to the nearest ER should begin having SI/HI.          Follow up in four weeks        Errors in dictation may reflect use of voice recognition software and not all errors in transcription may have been detected prior to signing.              This document has been electronically signed by DIPTI Perea   April 8, 2021 14:42 EDT

## 2021-04-08 NOTE — TELEPHONE ENCOUNTER
Patient's primary care if no longer at this practice. Will you look at this refill request please?

## 2021-04-09 RX ORDER — LEVOTHYROXINE SODIUM 0.07 MG/1
75 TABLET ORAL DAILY
Qty: 90 TABLET | Refills: 3 | Status: SHIPPED | OUTPATIENT
Start: 2021-04-09 | End: 2021-05-25 | Stop reason: SDUPTHER

## 2021-04-09 RX ORDER — ATENOLOL 25 MG/1
25 TABLET ORAL 2 TIMES DAILY PRN
Qty: 180 TABLET | Refills: 3 | Status: SHIPPED | OUTPATIENT
Start: 2021-04-09 | End: 2021-05-25 | Stop reason: SDUPTHER

## 2021-04-09 RX ORDER — SILDENAFIL CITRATE 20 MG/1
20 TABLET ORAL DAILY
Qty: 90 TABLET | Refills: 3 | Status: SHIPPED | OUTPATIENT
Start: 2021-04-09 | End: 2021-05-25 | Stop reason: SDUPTHER

## 2021-04-09 RX ORDER — TAMSULOSIN HYDROCHLORIDE 0.4 MG/1
1 CAPSULE ORAL NIGHTLY
Qty: 90 CAPSULE | Refills: 3 | Status: SHIPPED | OUTPATIENT
Start: 2021-04-09 | End: 2021-05-25 | Stop reason: SDUPTHER

## 2021-04-09 RX ORDER — FAMCICLOVIR 500 MG/1
500 TABLET ORAL 2 TIMES DAILY
Qty: 180 TABLET | Refills: 3 | Status: SHIPPED | OUTPATIENT
Start: 2021-04-09 | End: 2021-05-25 | Stop reason: SDUPTHER

## 2021-04-09 RX ORDER — OMEPRAZOLE 40 MG/1
40 CAPSULE, DELAYED RELEASE ORAL
Qty: 180 CAPSULE | Refills: 1 | Status: SHIPPED | OUTPATIENT
Start: 2021-04-09 | End: 2021-05-25 | Stop reason: SDUPTHER

## 2021-04-09 RX ORDER — FEBUXOSTAT 40 MG/1
40 TABLET, FILM COATED ORAL DAILY
Qty: 90 TABLET | Refills: 3 | Status: SHIPPED | OUTPATIENT
Start: 2021-04-09 | End: 2021-05-25 | Stop reason: SDUPTHER

## 2021-04-09 RX ORDER — ROSUVASTATIN CALCIUM 5 MG/1
5 TABLET, COATED ORAL NIGHTLY
Qty: 90 TABLET | Refills: 3 | Status: SHIPPED | OUTPATIENT
Start: 2021-04-09 | End: 2021-05-25 | Stop reason: SDUPTHER

## 2021-05-07 ENCOUNTER — OFFICE VISIT (OUTPATIENT)
Dept: PSYCHIATRY | Facility: CLINIC | Age: 56
End: 2021-05-07

## 2021-05-07 VITALS
SYSTOLIC BLOOD PRESSURE: 144 MMHG | WEIGHT: 197 LBS | HEART RATE: 89 BPM | DIASTOLIC BLOOD PRESSURE: 90 MMHG | BODY MASS INDEX: 26.72 KG/M2

## 2021-05-07 DIAGNOSIS — F41.0 PANIC DISORDER: ICD-10-CM

## 2021-05-07 DIAGNOSIS — F41.1 GENERALIZED ANXIETY DISORDER: ICD-10-CM

## 2021-05-07 DIAGNOSIS — F39 UNSPECIFIED MOOD (AFFECTIVE) DISORDER (HCC): ICD-10-CM

## 2021-05-07 PROCEDURE — 99213 OFFICE O/P EST LOW 20 MIN: CPT | Performed by: NURSE PRACTITIONER

## 2021-05-07 RX ORDER — TRIFLUOPERAZINE HYDROCHLORIDE 1 MG/1
0.5 TABLET, FILM COATED ORAL DAILY PRN
Qty: 10 TABLET | Refills: 0 | Status: SHIPPED | OUTPATIENT
Start: 2021-05-07 | End: 2021-05-25 | Stop reason: SDUPTHER

## 2021-05-07 RX ORDER — BUSPIRONE HYDROCHLORIDE 10 MG/1
TABLET ORAL
Qty: 120 TABLET | Refills: 0 | Status: SHIPPED | OUTPATIENT
Start: 2021-05-07 | End: 2021-05-25 | Stop reason: SDUPTHER

## 2021-05-07 RX ORDER — ESCITALOPRAM OXALATE 20 MG/1
20 TABLET ORAL DAILY
Qty: 90 TABLET | Refills: 1 | Status: SHIPPED | OUTPATIENT
Start: 2021-05-07 | End: 2021-05-25 | Stop reason: SDUPTHER

## 2021-05-25 ENCOUNTER — OFFICE VISIT (OUTPATIENT)
Dept: FAMILY MEDICINE CLINIC | Facility: CLINIC | Age: 56
End: 2021-05-25

## 2021-05-25 VITALS
BODY MASS INDEX: 26.68 KG/M2 | HEART RATE: 93 BPM | SYSTOLIC BLOOD PRESSURE: 162 MMHG | WEIGHT: 197 LBS | DIASTOLIC BLOOD PRESSURE: 104 MMHG | OXYGEN SATURATION: 98 % | TEMPERATURE: 96.9 F | HEIGHT: 72 IN

## 2021-05-25 DIAGNOSIS — E03.9 ACQUIRED HYPOTHYROIDISM: ICD-10-CM

## 2021-05-25 DIAGNOSIS — K59.04 CHRONIC IDIOPATHIC CONSTIPATION: ICD-10-CM

## 2021-05-25 DIAGNOSIS — L25.5 DERMATITIS DUE TO PLANT: Chronic | ICD-10-CM

## 2021-05-25 DIAGNOSIS — I10 ESSENTIAL HYPERTENSION: Chronic | ICD-10-CM

## 2021-05-25 DIAGNOSIS — M17.0 PRIMARY OSTEOARTHRITIS OF BOTH KNEES: Chronic | ICD-10-CM

## 2021-05-25 DIAGNOSIS — E78.2 MIXED HYPERLIPIDEMIA: ICD-10-CM

## 2021-05-25 DIAGNOSIS — F41.1 GENERALIZED ANXIETY DISORDER: ICD-10-CM

## 2021-05-25 DIAGNOSIS — M10.00 IDIOPATHIC GOUT, UNSPECIFIED CHRONICITY, UNSPECIFIED SITE: Chronic | ICD-10-CM

## 2021-05-25 DIAGNOSIS — F39 UNSPECIFIED MOOD (AFFECTIVE) DISORDER (HCC): ICD-10-CM

## 2021-05-25 DIAGNOSIS — B00.1 COLD SORE: ICD-10-CM

## 2021-05-25 DIAGNOSIS — F41.1 GENERALIZED ANXIETY DISORDER: Chronic | ICD-10-CM

## 2021-05-25 DIAGNOSIS — M79.644 PAIN OF FINGER OF RIGHT HAND: Primary | ICD-10-CM

## 2021-05-25 DIAGNOSIS — F41.0 PANIC DISORDER: ICD-10-CM

## 2021-05-25 DIAGNOSIS — N40.1 BENIGN PROSTATIC HYPERPLASIA WITH POST-VOID DRIBBLING: ICD-10-CM

## 2021-05-25 DIAGNOSIS — F41.0 PANIC DISORDER: Chronic | ICD-10-CM

## 2021-05-25 DIAGNOSIS — N52.9 ERECTILE DYSFUNCTION, UNSPECIFIED ERECTILE DYSFUNCTION TYPE: ICD-10-CM

## 2021-05-25 DIAGNOSIS — N39.43 BENIGN PROSTATIC HYPERPLASIA WITH POST-VOID DRIBBLING: ICD-10-CM

## 2021-05-25 PROCEDURE — 99215 OFFICE O/P EST HI 40 MIN: CPT | Performed by: PHYSICIAN ASSISTANT

## 2021-05-25 RX ORDER — TRIFLUOPERAZINE HYDROCHLORIDE 1 MG/1
0.5 TABLET, FILM COATED ORAL DAILY PRN
Qty: 10 TABLET | Refills: 5 | Status: SHIPPED | OUTPATIENT
Start: 2021-05-25 | End: 2021-12-14 | Stop reason: SDUPTHER

## 2021-05-25 RX ORDER — NABUMETONE 500 MG/1
500 TABLET, FILM COATED ORAL 2 TIMES DAILY PRN
Qty: 60 TABLET | Refills: 5 | Status: SHIPPED | OUTPATIENT
Start: 2021-05-25

## 2021-05-25 RX ORDER — TRIAMCINOLONE ACETONIDE 5 MG/G
CREAM TOPICAL 2 TIMES DAILY PRN
Qty: 60 G | Refills: 0 | Status: SHIPPED | OUTPATIENT
Start: 2021-05-25

## 2021-05-25 RX ORDER — TAMSULOSIN HYDROCHLORIDE 0.4 MG/1
1 CAPSULE ORAL NIGHTLY
Qty: 30 CAPSULE | Refills: 5 | Status: SHIPPED | OUTPATIENT
Start: 2021-05-25

## 2021-05-25 RX ORDER — ATENOLOL 25 MG/1
25 TABLET ORAL 2 TIMES DAILY PRN
Qty: 60 TABLET | Refills: 5 | Status: SHIPPED | OUTPATIENT
Start: 2021-05-25

## 2021-05-25 RX ORDER — FAMCICLOVIR 500 MG/1
500 TABLET ORAL 2 TIMES DAILY
Qty: 60 TABLET | Refills: 5 | Status: SHIPPED | OUTPATIENT
Start: 2021-05-25

## 2021-05-25 RX ORDER — LEVOTHYROXINE SODIUM 0.07 MG/1
75 TABLET ORAL DAILY
Qty: 30 TABLET | Refills: 5 | Status: SHIPPED | OUTPATIENT
Start: 2021-05-25

## 2021-05-25 RX ORDER — FEBUXOSTAT 40 MG/1
40 TABLET, FILM COATED ORAL DAILY
Qty: 30 TABLET | Refills: 5 | Status: SHIPPED | OUTPATIENT
Start: 2021-05-25

## 2021-05-25 RX ORDER — ESCITALOPRAM OXALATE 20 MG/1
20 TABLET ORAL DAILY
Qty: 30 TABLET | Refills: 5 | Status: SHIPPED | OUTPATIENT
Start: 2021-05-25 | End: 2021-12-14 | Stop reason: SDUPTHER

## 2021-05-25 RX ORDER — BUSPIRONE HYDROCHLORIDE 10 MG/1
TABLET ORAL
Qty: 120 TABLET | Refills: 0 | Status: SHIPPED | OUTPATIENT
Start: 2021-05-25 | End: 2021-05-25 | Stop reason: SDUPTHER

## 2021-05-25 RX ORDER — TRIFLUOPERAZINE HYDROCHLORIDE 1 MG/1
0.5 TABLET, FILM COATED ORAL DAILY PRN
Qty: 10 TABLET | Refills: 0 | Status: SHIPPED | OUTPATIENT
Start: 2021-05-25 | End: 2021-05-25 | Stop reason: SDUPTHER

## 2021-05-25 RX ORDER — ESCITALOPRAM OXALATE 20 MG/1
20 TABLET ORAL DAILY
Qty: 90 TABLET | Refills: 1 | Status: SHIPPED | OUTPATIENT
Start: 2021-05-25 | End: 2021-05-25 | Stop reason: SDUPTHER

## 2021-05-25 RX ORDER — OMEPRAZOLE 40 MG/1
40 CAPSULE, DELAYED RELEASE ORAL
Qty: 60 CAPSULE | Refills: 5 | Status: SHIPPED | OUTPATIENT
Start: 2021-05-25

## 2021-05-25 RX ORDER — BUSPIRONE HYDROCHLORIDE 10 MG/1
TABLET ORAL
Qty: 120 TABLET | Refills: 5 | Status: SHIPPED | OUTPATIENT
Start: 2021-05-25 | End: 2021-12-14 | Stop reason: SDUPTHER

## 2021-05-25 RX ORDER — ROSUVASTATIN CALCIUM 5 MG/1
5 TABLET, COATED ORAL NIGHTLY
Qty: 30 TABLET | Refills: 5 | Status: SHIPPED | OUTPATIENT
Start: 2021-05-25

## 2021-05-25 RX ORDER — SILDENAFIL CITRATE 20 MG/1
20 TABLET ORAL DAILY
Qty: 30 TABLET | Refills: 5 | Status: SHIPPED | OUTPATIENT
Start: 2021-05-25

## 2021-05-31 NOTE — PATIENT INSTRUCTIONS

## 2021-05-31 NOTE — PROGRESS NOTES
"Subjective   Barrington Chao is a 55 y.o. male.       Chief Complaint -finger pain    History of Present Illness -    ROS    Finger pain-  Patient complains of pain in his right index and middle finger.  He states that he closed his right index and middle finger in a car door accidentally 2 months ago.  He reports that the nail fell off one of the fingers shortly thereafter.  He did not seek medical care at that time.  Some relief with ibuprofen or Motrin.  He states he continues to have pain and \"feels like a rock\" if loosen the tip of his finger when he presses it.    Hypertension-  Uncontrolled as patient states he has been out of his atenolol for few days.  He reports that when he takes his atenolol regularly blood pressure at home stays less than 140/90.    Anxiety/panic disorder-stable with BuSpar, Lexapro, and Stelazine    Cold sore-stable with Famvir    Gout-stable with Uloric for prophylaxis    Hypothyroidism-stable with Synthroid 75 mcg daily    Rash-  Patient complains of a rash on his wrist after being exposed to poison ivy outside 5 days ago.  He reports associated pruritus.    Hyperlipidemia-stable with Crestor    Erectile dysfunction-stable with sildenafil    BPH-stable with Flomax    Osteoarthritis bilateral knees-  Patient complains of pain in bilateral knees due to osteoarthritis.  No specific injury.  Some relief with Norco that he has been taking for several years.  Pain is described as varying from mild to severe aching and throbbing.  Pain is worse with associated stiffness in the morning.      The following portions of the patient's history were reviewed and updated as appropriate: allergies, current medications, past family history, past medical history, past social history, past surgical history and problem list.    Review of Systems   Constitutional: Negative for activity change, appetite change, fatigue and fever.   HENT: Negative for ear pain, sinus pressure and sore throat.    Eyes: " "Negative for pain and visual disturbance.   Respiratory: Negative for cough and chest tightness.    Cardiovascular: Negative for chest pain and palpitations.   Gastrointestinal: Negative for abdominal pain, constipation, diarrhea, nausea and vomiting.   Endocrine: Negative for polydipsia and polyuria.   Genitourinary: Negative for dysuria and frequency.   Musculoskeletal: Positive for arthralgias (Finger pain). Negative for back pain and myalgias.   Skin: Positive for rash. Negative for color change.   Allergic/Immunologic: Negative for food allergies and immunocompromised state.   Neurological: Positive for headaches (With elevated blood pressure). Negative for dizziness and syncope.   Hematological: Negative for adenopathy. Does not bruise/bleed easily.   Psychiatric/Behavioral: Negative for hallucinations and suicidal ideas. The patient is not nervous/anxious.        Objective  Vital signs:  BP (!) 162/104   Pulse 93   Temp 96.9 °F (36.1 °C) (Temporal)   Ht 182.9 cm (72\")   Wt 89.4 kg (197 lb)   SpO2 98%   BMI 26.72 kg/m²     Physical Exam  Vitals and nursing note reviewed.   Constitutional:       Appearance: Normal appearance. He is well-developed.   HENT:      Head: Normocephalic and atraumatic.      Right Ear: Tympanic membrane normal.      Left Ear: Tympanic membrane normal.      Nose: Nose normal.      Mouth/Throat:      Mouth: Mucous membranes are moist.      Pharynx: No oropharyngeal exudate or posterior oropharyngeal erythema.   Eyes:      Extraocular Movements: Extraocular movements intact.      Conjunctiva/sclera: Conjunctivae normal.   Neck:      Thyroid: No thyromegaly.      Trachea: No tracheal deviation.   Cardiovascular:      Rate and Rhythm: Normal rate and regular rhythm.      Heart sounds: Normal heart sounds. No murmur heard.     Pulmonary:      Effort: Pulmonary effort is normal. No respiratory distress.      Breath sounds: Normal breath sounds. No wheezing.   Abdominal:      General: " Bowel sounds are normal.      Palpations: Abdomen is soft.      Tenderness: There is no abdominal tenderness. There is no guarding.   Musculoskeletal:         General: Tenderness present. Normal range of motion.      Cervical back: Normal range of motion and neck supple.      Comments: Range of joint motion is intact in all fingers with tenderness noted to palpation of the above right index and middle finger.  Discoloration of fingernails noted consistent with underlying contusion.  Coarse crepitus noted bilateral knees.   Lymphadenopathy:      Cervical: No cervical adenopathy.   Skin:     General: Skin is warm and dry.      Findings: No rash.   Neurological:      General: No focal deficit present.      Mental Status: He is alert and oriented to person, place, and time.   Psychiatric:         Mood and Affect: Mood normal.         Behavior: Behavior normal.         Thought Content: Thought content normal.         The following data was reviewed by: DILLAN Giordano on 05/25/2021:  CMP    CMP 6/29/20   Glucose 96   BUN 20   Creatinine 1.30 (A)   eGFR Non African Am 57 (A)   Sodium 138   Potassium 4.4   Chloride 102   Calcium 9.5   Albumin 4.90   Total Bilirubin 0.5   Alkaline Phosphatase 66   AST (SGOT) 43 (A)   ALT (SGPT) 50 (A)   (A) Abnormal value            CBC w/diff    CBC w/Diff 6/29/20   WBC 5.49   RBC 5.31   Hemoglobin 16.4   Hematocrit 48.2   MCV 90.8   MCH 30.9   MCHC 34.0   RDW 12.8   Platelets 180   Neutrophil Rel % 67.2   Immature Granulocyte Rel % 0.4   Lymphocyte Rel % 23.0   Monocyte Rel % 6.4   Eosinophil Rel % 1.5   Basophil Rel % 1.5           Lipid Panel    Lipid Panel 6/29/20   Total Cholesterol 203 (A)   Triglycerides 66   HDL Cholesterol 38 (A)   VLDL Cholesterol 13.2   LDL Cholesterol  152 (A)   LDL/HDL Ratio 3.99   (A) Abnormal value            TSH    TSH 6/29/20   TSH 1.100           Most Recent A1C    HGBA1C Most Recent 6/29/20   Hemoglobin A1C 5.60                  Assessment/Plan      Diagnoses and all orders for this visit:    1. Pain of finger of right hand (Primary)  Comments:  Start nabumetone  Discontinue Norco  X-rays ordered for further evaluation  Orders:  -     XR Hand 3+ View Right; Future  -     XR finger 2+ vw right; Future  -     Ambulatory Referral to Orthopedic Surgery    2. Panic disorder  Comments:  Continue BuSpar and Stelazine  Orders:  -     busPIRone (BUSPAR) 10 MG tablet; Take 1.5 tablets by mouth Every Morning AND 1.5 tablets Daily With Lunch AND 1 tablet Every Evening.  Dispense: 120 tablet; Refill: 5  -     escitalopram (LEXAPRO) 20 MG tablet; Take 1 tablet by mouth Daily.  Dispense: 30 tablet; Refill: 5  -     trifluoperazine (STELAZINE) 1 MG tablet; Take 0.5 tablets by mouth Daily As Needed (anxiety).  Dispense: 10 tablet; Refill: 5    3. Generalized anxiety disorder  Comments:  Continue Lexapro  Advised conservative measures for dealing with stress and anxiety  Orders:  -     busPIRone (BUSPAR) 10 MG tablet; Take 1.5 tablets by mouth Every Morning AND 1.5 tablets Daily With Lunch AND 1 tablet Every Evening.  Dispense: 120 tablet; Refill: 5  -     escitalopram (LEXAPRO) 20 MG tablet; Take 1 tablet by mouth Daily.  Dispense: 30 tablet; Refill: 5    4. Cold sore  Comments:  Positive herpes simplex 1 and 2  Orders:  -     famciclovir (FAMVIR) 500 MG tablet; Take 1 tablet by mouth 2 (Two) Times a Day.  Dispense: 60 tablet; Refill: 5    5. Idiopathic gout, unspecified chronicity, unspecified site  Comments:  Continue Uloric for prophylaxis  Orders:  -     febuxostat (ULORIC) 40 MG tablet; Take 1 tablet by mouth Daily.  Dispense: 30 tablet; Refill: 5    6. Acquired hypothyroidism  -     levothyroxine (Synthroid) 75 MCG tablet; Take 1 tablet by mouth Daily.  Dispense: 30 tablet; Refill: 5  -     TSH; Future  -     T4, Free; Future    7. Chronic idiopathic constipation  -     linaclotide (LINZESS) 145 MCG capsule capsule; Take 1 capsule by mouth Every Morning Before  Breakfast. Wait 30 mins before eating.  Dispense: 30 capsule; Refill: 5    8. Mixed hyperlipidemia  -     rosuvastatin (Crestor) 5 MG tablet; Take 1 tablet by mouth Every Night.  Dispense: 30 tablet; Refill: 5  -     Comprehensive Metabolic Panel; Future  -     Lipid Panel; Future    9. Erectile dysfunction, unspecified erectile dysfunction type  -     sildenafil (REVATIO) 20 MG tablet; Take 1 tablet by mouth Daily.  Dispense: 30 tablet; Refill: 5    10. Benign prostatic hyperplasia with post-void dribbling  -     tamsulosin (Flomax) 0.4 MG capsule 24 hr capsule; Take 1 capsule by mouth Every Night.  Dispense: 30 capsule; Refill: 5    11. Primary osteoarthritis of both knees  Comments:  Discontinue Norco  Advised conservative measures including topical agents  Start nabumetone  Orders:  -     nabumetone (RELAFEN) 500 MG tablet; Take 1 tablet by mouth 2 (Two) Times a Day As Needed (joint pain).  Dispense: 60 tablet; Refill: 5    12. Essential hypertension  Comments:  Elevated today since patient has been out of his atenolol and is currently in pain  Advised to restart atenolol  Continue to monitor  Orders:  -     atenolol (Tenormin) 25 MG tablet; Take 1 tablet by mouth 2 (Two) Times a Day As Needed (anxiety, do not take if heart rate less than 60).  Dispense: 60 tablet; Refill: 5  -     Comprehensive Metabolic Panel; Future    13. Dermatitis due to plant  Comments:  Start triamcinolone cream  Advised home cleaning to prevent recurrence  Orders:  -     triamcinolone (KENALOG) 0.5 % cream; Apply  topically to the appropriate area as directed 2 (Two) Times a Day As Needed for Rash.  Dispense: 60 g; Refill: 0    Other orders  -     omeprazole (priLOSEC) 40 MG capsule; Take 1 capsule by mouth 2 (Two) Times a Day Before Meals. Take a half hour before breakfast  Dispense: 60 capsule; Refill: 5        I spent 40 minutes caring for Barrington on this date of service. This time includes time spent by me in the following  activities:preparing for the visit, reviewing tests, obtaining and/or reviewing a separately obtained history, performing a medically appropriate examination and/or evaluation , counseling and educating the patient/family/caregiver, ordering medications, tests, or procedures and documenting information in the medical record    Patient was given instructions and counseling regarding his condition or for health maintenance advice. Please see specific information pulled into the AVS if appropriate      This document has been electronically signed by:  Violeta Rosales PA-C

## 2021-06-03 ENCOUNTER — HOSPITAL ENCOUNTER (OUTPATIENT)
Dept: GENERAL RADIOLOGY | Facility: HOSPITAL | Age: 56
Discharge: HOME | End: 2021-06-03
Admitting: PHYSICIAN ASSISTANT

## 2021-06-03 DIAGNOSIS — M79.644 PAIN OF FINGER OF RIGHT HAND: ICD-10-CM

## 2021-06-03 PROCEDURE — 73140 X-RAY EXAM OF FINGER(S): CPT

## 2021-06-03 PROCEDURE — 73130 X-RAY EXAM OF HAND: CPT

## 2021-06-04 PROCEDURE — 73130 X-RAY EXAM OF HAND: CPT | Performed by: RADIOLOGY

## 2021-06-07 DIAGNOSIS — S62.660A CLOSED NONDISPLACED FRACTURE OF DISTAL PHALANX OF RIGHT INDEX FINGER, INITIAL ENCOUNTER: Primary | ICD-10-CM

## 2021-12-14 ENCOUNTER — OFFICE VISIT (OUTPATIENT)
Dept: PSYCHIATRY | Facility: CLINIC | Age: 56
End: 2021-12-14

## 2021-12-14 VITALS
DIASTOLIC BLOOD PRESSURE: 88 MMHG | HEART RATE: 64 BPM | WEIGHT: 208 LBS | BODY MASS INDEX: 28.21 KG/M2 | SYSTOLIC BLOOD PRESSURE: 138 MMHG

## 2021-12-14 DIAGNOSIS — F41.0 PANIC DISORDER: Chronic | ICD-10-CM

## 2021-12-14 DIAGNOSIS — F41.1 GENERALIZED ANXIETY DISORDER: Chronic | ICD-10-CM

## 2021-12-14 PROCEDURE — 99213 OFFICE O/P EST LOW 20 MIN: CPT | Performed by: NURSE PRACTITIONER

## 2021-12-14 RX ORDER — BUSPIRONE HYDROCHLORIDE 10 MG/1
TABLET ORAL
Qty: 120 TABLET | Refills: 1 | Status: SHIPPED | OUTPATIENT
Start: 2021-12-14 | End: 2021-12-14

## 2021-12-14 RX ORDER — TRIFLUOPERAZINE HYDROCHLORIDE 1 MG/1
1 TABLET, FILM COATED ORAL 2 TIMES DAILY PRN
Qty: 60 TABLET | Refills: 1 | Status: SHIPPED | OUTPATIENT
Start: 2021-12-14

## 2021-12-14 RX ORDER — BUSPIRONE HYDROCHLORIDE 15 MG/1
15 TABLET ORAL 3 TIMES DAILY
Qty: 90 TABLET | Refills: 0 | Status: SHIPPED | OUTPATIENT
Start: 2021-12-14

## 2021-12-14 RX ORDER — ESCITALOPRAM OXALATE 20 MG/1
20 TABLET ORAL DAILY
Qty: 30 TABLET | Refills: 1 | Status: SHIPPED | OUTPATIENT
Start: 2021-12-14

## 2021-12-14 NOTE — PROGRESS NOTES
Subjective   Barrington Chao is a 56 y.o. male is here today for medication management follow-up.    Chief Complaint:  Panic anxiety     History of Present Illness:    Patient presents today for follow up for medication management for panic and anxiety. Patient states still having the panic attacks. Patient states he tried the Stelazine and was taking it twice daily. Patient states some depression. Patient reports currently depression is at a 4 on a 0-10 scale with 10 being the worst. Patient reports symptoms of depression of depressed mood. Patient reports anxiety is at a 7-8 on a 0-10 scale with 10 being the worst. Patient reports sleeping 8 hours a night and patient denies any nightmares. Patient states having panic attacks at night. Patient states lasting about two to three hours. Patient states waking up in the middle of the night feel like suffocating, out of breath, and heart pounding. Patient states then get dizzy and tingling and then try to walk around and calm down. Patient reports appetite is good and eating 2-3 meals a day. Patient denies any auditory or visual hallucinations. Patient adamantly denies any SI or HI. Patient denies any side effects to medications. Patient denies any medical changes since last visit.   The following portions of the patient's history were reviewed and updated as appropriate: allergies, current medications, past family history, past medical history, past social history, past surgical history and problem list.    Review of Systems   Constitutional: Negative.    Respiratory: Negative.    Cardiovascular: Negative.    Gastrointestinal: Negative.    Neurological: Negative.    Psychiatric/Behavioral: Positive for dysphoric mood and sleep disturbance. The patient is nervous/anxious.        Objective   Physical Exam  Vitals reviewed.   Constitutional:       Appearance: Normal appearance. He is well-developed and well-groomed.   Neurological:      Mental Status: He is alert.    Psychiatric:         Attention and Perception: Attention and perception normal.         Mood and Affect: Mood is anxious. Affect is blunt.         Speech: Speech normal.         Behavior: Behavior normal. Behavior is cooperative.         Thought Content: Thought content normal.         Cognition and Memory: Cognition and memory normal.         Judgment: Judgment normal.       Blood pressure 138/88, pulse 64, weight 94.3 kg (208 lb). Body mass index is 28.21 kg/m².    No Known Allergies  Medication List:   Current Outpatient Medications   Medication Sig Dispense Refill   • atenolol (Tenormin) 25 MG tablet Take 1 tablet by mouth 2 (Two) Times a Day As Needed (anxiety, do not take if heart rate less than 60). 60 tablet 5   • busPIRone (BUSPAR) 15 MG tablet Take 1 tablet by mouth 3 (Three) Times a Day. 90 tablet 0   • escitalopram (LEXAPRO) 20 MG tablet Take 1 tablet by mouth Daily. 30 tablet 1   • famciclovir (FAMVIR) 500 MG tablet Take 1 tablet by mouth 2 (Two) Times a Day. 60 tablet 5   • febuxostat (ULORIC) 40 MG tablet Take 1 tablet by mouth Daily. 30 tablet 5   • levothyroxine (Synthroid) 75 MCG tablet Take 1 tablet by mouth Daily. 30 tablet 5   • linaclotide (LINZESS) 145 MCG capsule capsule Take 1 capsule by mouth Every Morning Before Breakfast. Wait 30 mins before eating. 30 capsule 5   • nabumetone (RELAFEN) 500 MG tablet Take 1 tablet by mouth 2 (Two) Times a Day As Needed (joint pain). 60 tablet 5   • omeprazole (priLOSEC) 40 MG capsule Take 1 capsule by mouth 2 (Two) Times a Day Before Meals. Take a half hour before breakfast 60 capsule 5   • rosuvastatin (Crestor) 5 MG tablet Take 1 tablet by mouth Every Night. 30 tablet 5   • sildenafil (REVATIO) 20 MG tablet Take 1 tablet by mouth Daily. 30 tablet 5   • tamsulosin (Flomax) 0.4 MG capsule 24 hr capsule Take 1 capsule by mouth Every Night. 30 capsule 5   • triamcinolone (KENALOG) 0.5 % cream Apply  topically to the appropriate area as directed 2 (Two)  Times a Day As Needed for Rash. 60 g 0   • trifluoperazine (STELAZINE) 1 MG tablet Take 1 tablet by mouth 2 (Two) Times a Day As Needed (anxiety). 60 tablet 1     No current facility-administered medications for this visit.       Mental Status Exam:   Hygiene:   good  Cooperation:  Cooperative  Eye Contact:  Good  Psychomotor Behavior:  Appropriate  Affect:  Blunted  Hopelessness: Denies  Speech:  Monotone  Thought Process:  Goal directed and Linear  Thought Content:  Normal  Suicidal:  None  Homicidal:  None  Hallucinations:  None  Delusion:  None  Memory:  Intact  Orientation:  Person, Place, Time and Situation  Reliability:  fair  Insight:  Poor  Judgement:  Fair  Impulse Control:  Fair  Physical/Medical Issues:  No           PHQ-9 Depression Screening  Little interest or pleasure in doing things? 1   Feeling down, depressed, or hopeless? 1   Trouble falling or staying asleep, or sleeping too much? 1   Feeling tired or having little energy? 3   Poor appetite or overeating? 2   Feeling bad about yourself - or that you are a failure or have let yourself or your family down? 1   Trouble concentrating on things, such as reading the newspaper or watching television? 2   Moving or speaking so slowly that other people could have noticed? Or the opposite - being so fidgety or restless that you have been moving around a lot more than usual? 1   Thoughts that you would be better off dead, or of hurting yourself in some way? 0   PHQ-9 Total Score 12   If you checked off any problems, how difficult have these problems made it for you to do your work, take care of things at home, or get along with other people? Very difficult                 Assessment/Plan   Diagnoses and all orders for this visit:    1. Panic disorder  -     Discontinue: busPIRone (BUSPAR) 10 MG tablet; Take 1.5 tablets by mouth Every Morning AND 1.5 tablets Daily With Lunch AND 1 tablet Every Evening.  Dispense: 120 tablet; Refill: 1  -     escitalopram  (LEXAPRO) 20 MG tablet; Take 1 tablet by mouth Daily.  Dispense: 30 tablet; Refill: 1  -     trifluoperazine (STELAZINE) 1 MG tablet; Take 1 tablet by mouth 2 (Two) Times a Day As Needed (anxiety).  Dispense: 60 tablet; Refill: 1  -     busPIRone (BUSPAR) 15 MG tablet; Take 1 tablet by mouth 3 (Three) Times a Day.  Dispense: 90 tablet; Refill: 0    2. Generalized anxiety disorder  -     Discontinue: busPIRone (BUSPAR) 10 MG tablet; Take 1.5 tablets by mouth Every Morning AND 1.5 tablets Daily With Lunch AND 1 tablet Every Evening.  Dispense: 120 tablet; Refill: 1  -     escitalopram (LEXAPRO) 20 MG tablet; Take 1 tablet by mouth Daily.  Dispense: 30 tablet; Refill: 1  -     busPIRone (BUSPAR) 15 MG tablet; Take 1 tablet by mouth 3 (Three) Times a Day.  Dispense: 90 tablet; Refill: 0            Discussed medication options with patient.  Increase BuSpar to 15 mg 3 times a day for worsening anxiety and panic.  Increase Stelazine to 1 mg twice a day as needed for anxiety.  Continue Lexapro 20 mg daily for anxiety and panic disorder. Reviewed the risks, benefits, and side effects of the medications; patient acknowledged and verbally consented.  Discussed with patient and provided worksheet for coping strategies for when feeling anxious or beginning a panic attack including grounding techniques. Patient is agreeable to call the office with any questions, concerns, or worsening of symptoms.  Patient is aware to call 911 or go to the nearest ER should begin having SI/HI.        Follow-up in 8 weeks          Errors in dictation may reflect use of voice recognition software and not all errors in transcription may have been detected prior to signing.              This document has been electronically signed by DIPTI Perea   December 14, 2021 16:21 EST

## 2023-05-01 ENCOUNTER — TRANSCRIBE ORDERS (OUTPATIENT)
Dept: ADMINISTRATIVE | Facility: HOSPITAL | Age: 58
End: 2023-05-01

## 2023-05-01 DIAGNOSIS — R22.31 LOCALIZED SWELLING, MASS, OR LUMP OF UPPER EXTREMITY, RIGHT: Primary | ICD-10-CM

## (undated) DEVICE — SYR LUERLOK 30CC

## (undated) DEVICE — TUBING, SUCTION, 1/4" X 20', STRAIGHT: Brand: MEDLINE INDUSTRIES, INC.

## (undated) DEVICE — THE BITE BLOCK MAXI, LATEX FREE STRAP IS USED TO PROTECT THE ENDOSCOPE INSERTION TUBE FROM BEING BITTEN BY THE PATIENT.

## (undated) DEVICE — FRCP BX RADJAW4 NDL 2.8 240CM LG OG BX40

## (undated) DEVICE — Device

## (undated) DEVICE — Device: Brand: DEFENDO AIR/WATER/SUCTION AND BIOPSY VALVE

## (undated) DEVICE — ENDOGATOR AUXILIARY WATER JET CONNECTOR: Brand: ENDOGATOR

## (undated) DEVICE — SUCTION CANISTER, 1500CC, RIGID: Brand: DEROYAL

## (undated) DEVICE — SINGLE PORT MANIFOLD: Brand: NEPTUNE 2

## (undated) DEVICE — CONN Y IRR DISP 1P/U

## (undated) DEVICE — GOWN,REINF,POLY,ECL,PP SLV,XL: Brand: MEDLINE